# Patient Record
Sex: FEMALE | Race: WHITE | NOT HISPANIC OR LATINO | Employment: OTHER | URBAN - METROPOLITAN AREA
[De-identification: names, ages, dates, MRNs, and addresses within clinical notes are randomized per-mention and may not be internally consistent; named-entity substitution may affect disease eponyms.]

---

## 2013-06-10 LAB — CRC RECOMMENDATION EXT: NORMAL

## 2017-06-20 ENCOUNTER — HISTORICAL (OUTPATIENT)
Dept: RADIOLOGY | Facility: HOSPITAL | Age: 62
End: 2017-06-20

## 2017-06-20 LAB
ABS NEUT (OLG): 2.15 X10(3)/MCL (ref 2.1–9.2)
ALBUMIN SERPL-MCNC: 4.1 GM/DL (ref 3.4–5)
ALBUMIN/GLOB SERPL: 1 {RATIO}
ALP SERPL-CCNC: 61 UNIT/L (ref 20–120)
ALT SERPL-CCNC: 18 UNIT/L
AST SERPL-CCNC: 16 UNIT/L
BASOPHILS # BLD AUTO: 0.01 X10(3)/MCL
BASOPHILS NFR BLD AUTO: 0 % (ref 0–1)
BILIRUB SERPL-MCNC: 0.5 MG/DL
BILIRUBIN DIRECT+TOT PNL SERPL-MCNC: <0.1 MG/DL
BILIRUBIN DIRECT+TOT PNL SERPL-MCNC: >0.4 MG/DL
BUN SERPL-MCNC: 18 MG/DL (ref 7–25)
CALCIUM SERPL-MCNC: 9.1 MG/DL (ref 8.4–10.3)
CHLORIDE SERPL-SCNC: 105 MMOL/L (ref 96–110)
CHOLEST SERPL-MCNC: 211 MG/DL
CHOLEST/HDLC SERPL: 3.6 {RATIO} (ref 0–4.4)
CO2 SERPL-SCNC: 28 MMOL/L (ref 24–32)
CREAT SERPL-MCNC: 0.49 MG/DL (ref 0.7–1.1)
EOSINOPHIL # BLD AUTO: 0.21 X10(3)/MCL
EOSINOPHIL NFR BLD AUTO: 5 % (ref 0–5)
ERYTHROCYTE [DISTWIDTH] IN BLOOD BY AUTOMATED COUNT: 13.2 % (ref 11.5–14.5)
GLOBULIN SER-MCNC: 3.2 GM/ML (ref 2.3–3.5)
GLUCOSE SERPL-MCNC: 98 MG/DL (ref 65–99)
HCT VFR BLD AUTO: 42.4 % (ref 35–46)
HDLC SERPL-MCNC: 58 MG/DL
HGB BLD-MCNC: 14.2 GM/DL (ref 12–16)
LDLC SERPL CALC-MCNC: 144 MG/DL (ref 0–130)
LYMPHOCYTES # BLD AUTO: 1.38 X10(3)/MCL
LYMPHOCYTES NFR BLD AUTO: 32 % (ref 15–40)
MCH RBC QN AUTO: 29 PG (ref 26–34)
MCHC RBC AUTO-ENTMCNC: 33.5 GM/DL (ref 31–37)
MCV RBC AUTO: 86.7 FL (ref 80–100)
MONOCYTES # BLD AUTO: 0.57 X10(3)/MCL
MONOCYTES NFR BLD AUTO: 13 % (ref 4–12)
NEUTROPHILS # BLD AUTO: 2.15 X10(3)/MCL
NEUTROPHILS NFR BLD AUTO: 50 X10(3)/MCL
PLATELET # BLD AUTO: 294 X10(3)/MCL (ref 130–400)
PMV BLD AUTO: 9.2 FL (ref 7.4–10.4)
POTASSIUM SERPL-SCNC: 4.3 MMOL/L (ref 3.6–5.2)
PROT SERPL-MCNC: 7.3 GM/DL (ref 6–8)
RBC # BLD AUTO: 4.89 X10(6)/MCL (ref 4–5.2)
SODIUM SERPL-SCNC: 139 MMOL/L (ref 135–146)
T4 FREE SERPL-MCNC: 0.64 NG/DL (ref 0.6–1.15)
TRIGL SERPL-MCNC: 47 MG/DL
TSH SERPL-ACNC: 2.01 MIU/L (ref 0.5–5)
VLDLC SERPL CALC-MCNC: 9 MG/DL
WBC # SPEC AUTO: 4.3 X10(3)/MCL (ref 4.5–11)

## 2017-06-28 ENCOUNTER — HISTORICAL (OUTPATIENT)
Dept: CARDIOLOGY | Facility: CLINIC | Age: 62
End: 2017-06-28

## 2018-03-15 ENCOUNTER — HISTORICAL (OUTPATIENT)
Dept: RADIOLOGY | Facility: HOSPITAL | Age: 63
End: 2018-03-15

## 2018-03-16 ENCOUNTER — HISTORICAL (OUTPATIENT)
Dept: RADIOLOGY | Facility: HOSPITAL | Age: 63
End: 2018-03-16

## 2018-06-12 ENCOUNTER — HISTORICAL (OUTPATIENT)
Dept: RADIOLOGY | Facility: HOSPITAL | Age: 63
End: 2018-06-12

## 2019-05-15 LAB — CRC RECOMMENDATION EXT: NORMAL

## 2021-05-12 ENCOUNTER — HISTORICAL (OUTPATIENT)
Dept: ADMINISTRATIVE | Facility: HOSPITAL | Age: 66
End: 2021-05-12

## 2021-05-12 LAB
ABS NEUT (OLG): 1.81 X10(3)/MCL (ref 2.1–9.2)
ALBUMIN SERPL-MCNC: 4.3 GM/DL (ref 3.4–4.8)
ALBUMIN/GLOB SERPL: 1.1 RATIO (ref 1.1–2)
ALP SERPL-CCNC: 96 UNIT/L (ref 40–150)
ALT SERPL-CCNC: 23 UNIT/L (ref 0–55)
APPEARANCE, UA: CLEAR
AST SERPL-CCNC: 26 UNIT/L (ref 5–34)
BACTERIA #/AREA URNS AUTO: ABNORMAL /HPF
BASOPHILS # BLD AUTO: 0 X10(3)/MCL (ref 0–0.2)
BASOPHILS NFR BLD AUTO: 1 %
BILIRUB SERPL-MCNC: 0.7 MG/DL
BILIRUB UR QL STRIP: NEGATIVE
BILIRUBIN DIRECT+TOT PNL SERPL-MCNC: 0.3 MG/DL (ref 0–0.5)
BILIRUBIN DIRECT+TOT PNL SERPL-MCNC: 0.4 MG/DL (ref 0–0.8)
BUN SERPL-MCNC: 9.8 MG/DL (ref 9.8–20.1)
CALCIUM SERPL-MCNC: 10.3 MG/DL (ref 8.4–10.2)
CHLORIDE SERPL-SCNC: 102 MMOL/L (ref 98–107)
CHOLEST SERPL-MCNC: 185 MG/DL
CHOLEST/HDLC SERPL: 3 {RATIO} (ref 0–5)
CO2 SERPL-SCNC: 29 MMOL/L (ref 23–31)
COLOR UR: ABNORMAL
CREAT SERPL-MCNC: 0.66 MG/DL (ref 0.55–1.02)
DEPRECATED CALCIDIOL+CALCIFEROL SERPL-MC: 42 NG/ML (ref 30–80)
EOSINOPHIL # BLD AUTO: 0.2 X10(3)/MCL (ref 0–0.9)
EOSINOPHIL NFR BLD AUTO: 4 %
ERYTHROCYTE [DISTWIDTH] IN BLOOD BY AUTOMATED COUNT: 13.7 % (ref 11.5–14.5)
EST CREAT CLEARANCE SER (OHS): 78.38 ML/MIN
GLOBULIN SER-MCNC: 3.9 GM/DL (ref 2.4–3.5)
GLUCOSE (UA): NEGATIVE
GLUCOSE SERPL-MCNC: 81 MG/DL (ref 82–115)
HCT VFR BLD AUTO: 45.6 % (ref 35–46)
HDLC SERPL-MCNC: 61 MG/DL (ref 35–60)
HGB BLD-MCNC: 14.7 GM/DL (ref 12–16)
HGB UR QL STRIP: NEGATIVE
HYALINE CASTS #/AREA URNS LPF: ABNORMAL /LPF
IMM GRANULOCYTES # BLD AUTO: 0.02 10*3/UL
IMM GRANULOCYTES NFR BLD AUTO: 0 %
KETONES UR QL STRIP: NEGATIVE
LDLC SERPL CALC-MCNC: 106 MG/DL (ref 50–140)
LEUKOCYTE ESTERASE UR QL STRIP: 250 LEU/UL
LYMPHOCYTES # BLD AUTO: 1.8 X10(3)/MCL (ref 0.6–4.6)
LYMPHOCYTES NFR BLD AUTO: 42 %
MCH RBC QN AUTO: 29.3 PG (ref 26–34)
MCHC RBC AUTO-ENTMCNC: 32.2 GM/DL (ref 31–37)
MCV RBC AUTO: 90.8 FL (ref 80–100)
MONOCYTES # BLD AUTO: 0.5 X10(3)/MCL (ref 0.1–1.3)
MONOCYTES NFR BLD AUTO: 12 %
NEUTROPHILS # BLD AUTO: 1.81 X10(3)/MCL (ref 2.1–9.2)
NEUTROPHILS NFR BLD AUTO: 41 %
NITRITE UR QL STRIP: NEGATIVE
PH UR STRIP: 6.5 [PH] (ref 4.5–8)
PLATELET # BLD AUTO: 335 X10(3)/MCL (ref 130–400)
PMV BLD AUTO: 9.7 FL (ref 7.4–10.4)
POTASSIUM SERPL-SCNC: 3.9 MMOL/L (ref 3.5–5.1)
PROT SERPL-MCNC: 8.2 GM/DL (ref 5.8–7.6)
PROT UR QL STRIP: NEGATIVE
RBC # BLD AUTO: 5.02 X10(6)/MCL (ref 4–5.2)
RBC #/AREA URNS AUTO: ABNORMAL /HPF
SODIUM SERPL-SCNC: 141 MMOL/L (ref 136–145)
SP GR UR STRIP: 1 (ref 1–1.03)
SQUAMOUS #/AREA URNS LPF: ABNORMAL /LPF
TRIGL SERPL-MCNC: 92 MG/DL (ref 37–140)
TSH SERPL-ACNC: 1.83 UIU/ML (ref 0.35–4.94)
UROBILINOGEN UR STRIP-ACNC: NORMAL
VIT B12 SERPL-MCNC: 688 PG/ML (ref 213–816)
VLDLC SERPL CALC-MCNC: 18 MG/DL
WBC # SPEC AUTO: 4.4 X10(3)/MCL (ref 4.5–11)
WBC #/AREA URNS AUTO: ABNORMAL /HPF

## 2021-05-14 LAB — FINAL CULTURE: NORMAL

## 2021-06-07 ENCOUNTER — HISTORICAL (OUTPATIENT)
Dept: ADMINISTRATIVE | Facility: HOSPITAL | Age: 66
End: 2021-06-07

## 2021-06-09 ENCOUNTER — HISTORICAL (OUTPATIENT)
Dept: ADMINISTRATIVE | Facility: HOSPITAL | Age: 66
End: 2021-06-09

## 2021-08-25 ENCOUNTER — HISTORICAL (OUTPATIENT)
Dept: RADIOLOGY | Facility: HOSPITAL | Age: 66
End: 2021-08-25

## 2021-09-28 LAB
HUMAN PAPILLOMAVIRUS (HPV): NORMAL
PAP RECOMMENDATION EXT: NORMAL
PAP SMEAR: NORMAL

## 2021-11-17 ENCOUNTER — HISTORICAL (OUTPATIENT)
Dept: RADIOLOGY | Facility: HOSPITAL | Age: 66
End: 2021-11-17

## 2021-11-17 LAB — BCS RECOMMENDATION EXT: NORMAL

## 2022-02-18 ENCOUNTER — HISTORICAL (OUTPATIENT)
Dept: FAMILY MEDICINE | Facility: CLINIC | Age: 67
End: 2022-02-18

## 2022-02-18 LAB
ABS NEUT (OLG): 1.69 (ref 2.1–9.2)
ALBUMIN SERPL-MCNC: 3.7 G/DL (ref 3.4–4.8)
ALBUMIN/GLOB SERPL: 1.1 {RATIO} (ref 1.1–2)
ALP SERPL-CCNC: 73 U/L (ref 40–150)
ALT SERPL-CCNC: 15 U/L (ref 0–55)
AST SERPL-CCNC: 19 U/L (ref 5–34)
BASOPHILS # BLD AUTO: 0 10*3/UL (ref 0–0.2)
BASOPHILS NFR BLD AUTO: 0 %
BILIRUB SERPL-MCNC: 0.6 MG/DL
BILIRUBIN DIRECT+TOT PNL SERPL-MCNC: 0.3 (ref 0–0.5)
BILIRUBIN DIRECT+TOT PNL SERPL-MCNC: 0.3 (ref 0–0.8)
BUN SERPL-MCNC: 12 MG/DL (ref 9.8–20.1)
CALCIUM SERPL-MCNC: 9 MG/DL (ref 8.7–10.5)
CHLORIDE SERPL-SCNC: 107 MMOL/L (ref 98–107)
CHOLEST SERPL-MCNC: 149 MG/DL
CHOLEST/HDLC SERPL: 3 {RATIO} (ref 0–5)
CO2 SERPL-SCNC: 26 MMOL/L (ref 23–31)
CREAT SERPL-MCNC: 0.66 MG/DL (ref 0.55–1.02)
CREAT UR-MCNC: 50.9 MG/DL (ref 45–106)
EOSINOPHIL # BLD AUTO: 0.2 10*3/UL (ref 0–0.9)
EOSINOPHIL NFR BLD AUTO: 5 %
ERYTHROCYTE [DISTWIDTH] IN BLOOD BY AUTOMATED COUNT: 13.6 % (ref 11.5–14.5)
EST. AVERAGE GLUCOSE BLD GHB EST-MCNC: 105.4 MG/DL
FLAG2 (OHS): 70
FLAG3 (OHS): 80
FLAGS (OHS): 80
GLOBULIN SER-MCNC: 3.3 G/DL (ref 2.4–3.5)
GLUCOSE SERPL-MCNC: 105 MG/DL (ref 82–115)
HBA1C MFR BLD: 5.3 %
HCT VFR BLD AUTO: 42 % (ref 35–46)
HDLC SERPL-MCNC: 58 MG/DL (ref 35–60)
HEMOLYSIS INTERF INDEX SERPL-ACNC: 10
HGB BLD-MCNC: 13.8 G/DL (ref 12–16)
ICTERIC INTERF INDEX SERPL-ACNC: 1
IMM GRANULOCYTES # BLD AUTO: 0.01 10*3/UL
IMM GRANULOCYTES NFR BLD AUTO: 0 %
LDLC SERPL CALC-MCNC: 80 MG/DL (ref 50–140)
LIPEMIC INTERF INDEX SERPL-ACNC: 2
LOW EVENT # SUSPECT FLAG (OHS): 80
LYMPHOCYTES # BLD AUTO: 1.3 10*3/UL (ref 0.6–4.6)
LYMPHOCYTES NFR BLD AUTO: 34 %
MANUAL DIFF? (OHS): NO
MCH RBC QN AUTO: 29.6 PG (ref 26–34)
MCHC RBC AUTO-ENTMCNC: 32.9 G/DL (ref 31–37)
MCV RBC AUTO: 89.9 FL (ref 80–100)
MICROALBUMIN UR-MCNC: 7.6
MICROALBUMIN/CREAT RATIO PNL UR: 14.9 (ref 0–30)
MO BLASTS SUSPECT FLAG (OHS): 40
MONOCYTES # BLD AUTO: 0.5 10*3/UL (ref 0.1–1.3)
MONOCYTES NFR BLD AUTO: 14 %
NEUTROPHILS # BLD AUTO: 1.69 10*3/UL (ref 2.1–9.2)
NEUTROPHILS NFR BLD AUTO: 46 %
NRBC BLD AUTO-RTO: 0 % (ref 0–0.2)
PLATELET # BLD AUTO: 298 10*3/UL (ref 130–400)
PMV BLD AUTO: 9 FL (ref 7.4–10.4)
POTASSIUM SERPL-SCNC: 4.4 MMOL/L (ref 3.5–5.1)
PROT SERPL-MCNC: 7 G/DL (ref 5.8–7.6)
RBC # BLD AUTO: 4.67 10*6/UL (ref 4–5.2)
SODIUM SERPL-SCNC: 139 MMOL/L (ref 136–145)
TRIGL SERPL-MCNC: 56 MG/DL (ref 37–140)
TSH SERPL-ACNC: 1.75 M[IU]/L (ref 0.35–4.94)
VLDLC SERPL CALC-MCNC: 11 MG/DL
WBC # SPEC AUTO: 3.7 10*3/UL (ref 4.5–11)

## 2022-04-10 ENCOUNTER — HISTORICAL (OUTPATIENT)
Dept: ADMINISTRATIVE | Facility: HOSPITAL | Age: 67
End: 2022-04-10
Payer: MEDICAID

## 2022-04-26 VITALS
HEIGHT: 65 IN | WEIGHT: 170.19 LBS | SYSTOLIC BLOOD PRESSURE: 115 MMHG | OXYGEN SATURATION: 99 % | DIASTOLIC BLOOD PRESSURE: 79 MMHG | BODY MASS INDEX: 28.36 KG/M2

## 2022-04-30 NOTE — PROGRESS NOTES
Patient:   Jennie Julio             MRN: [id number]            FIN: [id number]               Age:   65 years     Sex:  Female     :  1955   Associated Diagnoses:   None   Author:   Charmaine WEEKS, Soni VICENTE      Patient given results of imaging and labs in detail. OA of right ankle , hip and knee noted on imaging. Patient r

## 2022-05-03 NOTE — HISTORICAL [facility] CERNER
This is a historical note converted from Gregory. Formatting and pictures may have been removed.  Please reference Gregory for original formatting and attached multimedia. Chief Complaint  F/U left leg cellullitis. Pt concerned with left knee enlarged vein. Right pain from ankle to hip  History of Present Illness  And is a recently established patient with a history of hypertension, hyperlipidemia,?mitral valve prolapse,?varicose veins?of bilateral lower extremities?and anxiety who presents?for follow-up.? She was recently seen?in?an outside urgent?care facility and diagnosed with?sciatica?in the recent past.? She has been taking ibuprofen,?gabapentin?and Flexeril without relief.? Symptoms began?approximately the time she slipped and fell on black ice in February.? She persistent pain?right lower back and right lower extremity?since that fall.? X-rays?of her lumbar spine showed some?moderate degenerative change at the L3-4?level.? She complains of pain radiating down to the right lateral ankle?the pain is worse with movement.? She is waiting for approval of the physical therapy we ordered from?her insurance company.? Since our last visit, she was also seen in the urgent care?5/19/2021 and diagnosed with a insect bite?by?the urgent care doctor.? He treated her with topical hydrocortisone?which did not resolve her symptoms.? She was then seen in our walk-in clinic?5/24/2021?and diagnosed with a cellulitis/phlebitis?of her left thigh?and treated with?oral?doxycycline which she has since completed.  ?  Interval history:  ?  Chronic anxiety:?Marked improvement in symptoms per patient with increase to Effexor XR 75. ?No SI/HI  Osteoarthritis?of the right ankle: She has a history?of?a right?ankle fracture?from her days playing basketball.? She has had osteoarthritis of this right ankle documented in the past?but her pain today feels different?as it appears to radiate from?her low back.  Varicose veins bilateral lower  extremity:?We discussed?using mild to moderate?compression stockings while awaiting an appointment with Dr. Combs which she will?occur on June 28 at 9 AM.? She will see the nurse practitioner on that date.  Review of Systems  See HPI  Physical Exam  Vitals & Measurements  T:?37.0? ?C (Oral)? HR:?88(Peripheral)? RR:?18? BP:?112/76? SpO2:?98%?  HT:?165.00?cm? WT:?74.000?kg? BMI:?27.18?  Alert oriented x4 no acute distress  Psych: Calm, cooperative; no HI/SI  Palpable?bony prominence right lateral knee-patient unaware how long this has been present  Tenderness right lateral ankle  Positive straight leg raise; pain over paraspinal muscles and right?piriformis  Assessment/Plan  1.?Right-sided low back pain with right-sided sciatica?M54.41  ?Differential includes?piriformis syndrome/sciatica/degenerative disc disease. ?Patient has not responded to conservative management with muscle relaxers, NSAIDs?and gabapentin.??Symptoms have been present for 4 months.??She has failed home?exercise program. ?Trial of for physical therapy pending?approval by?[place] [facility].? Add tramadol?50 mg 1 p.o. 3 times daily as needed.?  accessed?no prior prescriptions?except gabapentin?noted.  Ordered:  MRI Lumbar Spine W/O Contrast, Routine, *Est. 06/07/21 3:00:00 CDT, Other (please specify), Lumbar radiculopathy, symptoms persist with conservative treatment, None, Ambulatory, Rad Type, Order for future visit, Radiculopathy, lumbosacral region  Right-sided low back pain with rig...  ?  2.?Radiculopathy, lumbosacral region?M54.17  ?MRI of the lumbar spine ordered?without contrast to rule out?degenerative disc disease  Ordered:  MRI Lumbar Spine W/O Contrast, Routine, *Est. 06/07/21 3:00:00 CDT, Other (please specify), Lumbar radiculopathy, symptoms persist with conservative treatment, None, Ambulatory, Rad Type, Order for future visit, Radiculopathy, lumbosacral region  Right-sided low back pain with rig...  ?  3.?Varicose veins of  bilateral lower extremities with pain?I83.813  ?Has appointment with Dr. Combs for June 28?9 AM. ?Cellulitis resolved  ?  4.?Pain and swelling of right knee?M25.561  ?Osteoarthritis of the right knee suspected?from prior injury.? Will?obtain x-ray?to further elucidate the bony prominence present on the right lateral?patella  Ordered:  XR Knee Right 3 Views, Routine, 06/07/21 15:40:00 CDT, None, Ambulatory, Rad Type, Pain and swelling of right knee, Not Scheduled, 06/07/21 15:40:00 CDT  ?  5.?Pain in lateral portion of right ankle?M25.571  ?History of osteoarthritis of the right ankle?and history?prior?remote?fracture?with?ankle pain.? Will image to rule out other pathology  Ordered:  XR Ankle Right Minimum 3 Views, Routine, 06/07/21 15:40:00 CDT, None, Ambulatory, Rad Type, Pain in lateral portion of right ankle, Not Scheduled, 06/07/21 15:40:00 CDT  ?  6.?Right hip pain?M25.551  Suspect pain is radiating?from lumbar?area.? Will image right hip to ensure no evidence of osteoarthritis present.  Ordered:  XR Hip Right 2 Views, Routine, 06/07/21 15:40:00 CDT, None, Ambulatory, Rad Type, Right hip pain, Not Scheduled, 06/07/21 15:40:00 CDT  ?  7.?Chronic anxiety?F41.9  ?Continue Effexor?XR?75?mg daily  ?  8.?OA - Osteoarthritis of ankle?M19.079  ?See #5  ?  Orders:  traMADol, 50 mg = 1 tab(s), Oral, TID, PRN PRN pain, X 10 day(s), # 30 tab(s), 0 Refill(s), Pharmacy: [id number], 165, cm, Height/Length Dosing, 06/07/21 14:26:00 CDT, 74, kg, Weight Dosing, 06/07/21 14:26:00 CDT  Referrals  Clinic Follow up, *Est. 06/21/21 3:00:00 CDT, Order for future visit, Right-sided low back pain with right-sided sciatica  Radiculopathy, lumbosacral region, [facility]   Problem List/Past Medical History  Ongoing  Anxiety  Chronic anxiety  Essential hypertension  HLD (hyperlipidemia)  HTN (hypertension)  Mitral valve prolapse  Mixed hyperlipidemia  MVP (mitral valve prolapse)  OA - Osteoarthritis of  ankle  Radiculopathy, lumbosacral region  Right-sided low back pain with right-sided sciatica  Varicose veins of bilateral lower extremities with pain  Varicose veins of legs  Historical  No qualifying data  Procedure/Surgical History  Colonoscopy (2017)  Tonsillectomy   Medications  aspirin 81 mg oral Delayed Release (EC) tablet, 81 mg= 1 tab(s), Oral, Daily, 2 refills  atorvastatin 40 mg oral tablet, 40 mg= 1 tab(s), Oral, Daily, 2 refills  benazepril 20 mg oral tablet, 20 mg= 1 tab(s), Oral, Daily, 2 refills  cyclobenzaprine 10 mg oral tablet, 10 mg= 1 tab(s), Oral, TID  Effexor XR 75 mg oral capsule, extended release, 75 mg= 1 cap(s), Oral, Daily, 2 refills  gabapentin 100 mg oral capsule, 100 mg= 1 cap(s), Oral, TID, 2 refills  traMADol 50 mg oral tablet, 50 mg= 1 tab(s), Oral, TID, PRN  Allergies  Sulfur?(diarrhea)  sulfa drugs?(Nausea, vomiting and diarrhea)  Social History  Abuse/Neglect  No, No, Yes, 06/07/2021  Alcohol  Past, Alcohol use interferes with work or home: No. Others hurt by drinking: No. Household alcohol concerns: No., 06/07/2021  Employment/School  Retired, 05/12/2021  Exercise  Exercise duration: 0., 06/07/2021  Home/Environment  Lives with Siblings. Living situation: Home/Independent., 05/12/2021    Never in , 05/12/2021  Nutrition/Health  Low fat, Low sodium, Good, 06/07/2021  Sexual  Gender Identity Identifies as female., 05/12/2021  Spiritual/Cultural  Judaism, No, 06/07/2021  Substance Use  Never, 06/07/2021  Tobacco  Never (less than 100 in lifetime), N/A, 06/07/2021  Family History  Alcoholism.: Father and Paternal Grandfather.  Diabetes mellitus type 2: Sister and Maternal Grandmother.  Metastatic cancer: Mother.  Primary malignant neoplasm of colon: Mother.  Stroke: Father.  Immunizations  Vaccine Date Status   COVID-19 mRNA, LNP-S, PF - Moderna 04/16/2021 Recorded   COVID-19 mRNA, LNP-S, PF - Moderna 03/20/2021 Recorded   influenza virus vaccine, inactivated  10/05/2020 Recorded   influenza virus vaccine, inactivated 11/23/2019 Recorded   influenza virus vaccine, inactivated 11/18/2013 Recorded   (06/07/2021 15:51 CDT XR Ankle Right Minimum 3 Views)  * Final Report *  ?  Reason For Exam  Pain  ?  Radiology Report  XR Ankle Right Minimum 3 Views  ?  HISTORY: Pain  ?  COMPARISON: None available  ?  FINDINGS:  No acute lucent fracture or dislocation. Regional arthritic change is  mild. Achilles enthesophyte noted. Soft tissue ossification noted  dorsal to the ankle joint.  ?  Signature Line  Electronically Signed By: America Ellis MD  Date/Time Signed: 06/07/2021 16:31 [1] (06/07/2021 15:51 CDT XR Hip Right 2 Views)  ?  Reason For Exam  Pain  ?  Radiology Report  ?  Clinical: Pain.  ?  FINDINGS: There is mild degenerative narrowing of the right hip joint  with mild subchondral sclerosis. No lytic or destructive change.  Articular surfaces alignment is maintained. No acute fractures.  ?  IMPRESSION:?  ?  Right hip mild degenerative changes.  ?  Signature Line  Electronically Signed By: Santo Peguero MD  Date/Time Signed: 06/07/2021 16:37  ? [2]  Diagnostic Results  (06/07/2021 15:51 CDT XR Knee Right 3 Views)  * Final Report *  ?  Reason For Exam  Pain  ?  Radiology Report  ?  Clinical: Pain.  ?  FINDINGS: There are right knee tricompartment mild degenerative  osteoarthritic changes. Right knee medial compartment degenerative  process is relatively more advanced with subchondral sclerosis and  marginal osteophytes. Articular surfaces alignment is maintained. No  acute fracture or subluxations. There is no joint effusion.?  ?  IMPRESSION:?  ?  Right knee tricompartment mild degenerative osteoarthritic changes.  ?  Signature Line  Electronically Signed By: Santo Peguero MD  Date/Time Signed: 06/07/2021 16:39 [3]     [1]?XR Ankle Right Minimum 3 Views; America Ellis MD 06/07/2021 15:51 CDT  [2]?XR Hip Right 2 Views; Santo Peguero MD 06/07/2021 15:51  CDT  [3]?XR Knee Right 3 Views; Sudhir WEEKS, Santo ANTONY 06/07/2021 15:51 CDT   JAME not And. She was seen at [facility] May 19th and diagnosed with a pinched nerve and was given hydrocortisone cream for presumed insect bite.
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
## 2022-05-16 PROBLEM — I10 PRIMARY HYPERTENSION: Status: ACTIVE | Noted: 2022-05-16

## 2022-05-16 PROBLEM — I34.1 MITRAL VALVE PROLAPSE: Status: ACTIVE | Noted: 2022-05-16

## 2022-05-16 PROBLEM — M19.079 OSTEOARTHRITIS OF ANKLE: Status: ACTIVE | Noted: 2022-05-16

## 2022-05-16 PROBLEM — F32.A MILD DEPRESSION: Status: ACTIVE | Noted: 2022-05-16

## 2022-05-16 PROBLEM — F41.9 CHRONIC ANXIETY: Status: ACTIVE | Noted: 2022-05-16

## 2022-05-16 PROBLEM — E78.2 MIXED HYPERLIPIDEMIA: Status: ACTIVE | Noted: 2022-05-16

## 2022-05-16 PROBLEM — M54.40 LOW BACK PAIN WITH SCIATICA: Status: ACTIVE | Noted: 2022-05-16

## 2022-05-16 PROBLEM — I83.90 VARICOSE VEINS OF LOWER EXTREMITY: Status: ACTIVE | Noted: 2022-05-16

## 2022-05-16 RX ORDER — BENAZEPRIL HYDROCHLORIDE 20 MG/1
TABLET ORAL
Qty: 30 TABLET | Refills: 5 | Status: SHIPPED | OUTPATIENT
Start: 2022-05-16 | End: 2022-11-15

## 2022-05-16 RX ORDER — GABAPENTIN 100 MG/1
CAPSULE ORAL
COMMUNITY
Start: 2022-04-14 | End: 2022-05-16 | Stop reason: SDUPTHER

## 2022-05-16 RX ORDER — ATORVASTATIN CALCIUM 40 MG/1
TABLET, FILM COATED ORAL
Qty: 30 TABLET | Refills: 5 | Status: SHIPPED | OUTPATIENT
Start: 2022-05-16 | End: 2022-11-15

## 2022-05-16 RX ORDER — ASPIRIN 81 MG/1
1 TABLET ORAL DAILY
COMMUNITY
Start: 2021-09-22 | End: 2024-02-05 | Stop reason: SDUPTHER

## 2022-05-16 RX ORDER — GABAPENTIN 100 MG/1
CAPSULE ORAL
Qty: 150 CAPSULE | Refills: 3 | Status: SHIPPED | OUTPATIENT
Start: 2022-05-16 | End: 2022-09-14

## 2022-05-16 RX ORDER — VENLAFAXINE HYDROCHLORIDE 75 MG/1
1 CAPSULE, EXTENDED RELEASE ORAL DAILY
COMMUNITY
Start: 2022-04-14 | End: 2022-05-16 | Stop reason: SDUPTHER

## 2022-05-16 RX ORDER — TRIAMCINOLONE ACETONIDE 1 MG/G
CREAM TOPICAL
COMMUNITY
Start: 2022-01-10 | End: 2022-06-02

## 2022-05-16 RX ORDER — FLUTICASONE PROPIONATE 50 MCG
2 SPRAY, SUSPENSION (ML) NASAL DAILY
COMMUNITY
Start: 2022-01-04 | End: 2023-01-18 | Stop reason: SDUPTHER

## 2022-05-16 RX ORDER — BENAZEPRIL HYDROCHLORIDE 20 MG/1
1 TABLET ORAL DAILY
COMMUNITY
Start: 2022-04-14 | End: 2022-05-16 | Stop reason: SDUPTHER

## 2022-05-16 RX ORDER — ATORVASTATIN CALCIUM 40 MG/1
1 TABLET, FILM COATED ORAL DAILY
COMMUNITY
Start: 2022-04-14 | End: 2022-05-16 | Stop reason: SDUPTHER

## 2022-05-16 RX ORDER — VENLAFAXINE HYDROCHLORIDE 75 MG/1
CAPSULE, EXTENDED RELEASE ORAL
Qty: 30 CAPSULE | Refills: 5 | Status: SHIPPED | OUTPATIENT
Start: 2022-05-16 | End: 2022-11-15

## 2022-05-16 RX ORDER — CYCLOBENZAPRINE HCL 10 MG
1 TABLET ORAL 3 TIMES DAILY PRN
COMMUNITY
Start: 2022-04-20 | End: 2024-02-05

## 2022-05-25 DIAGNOSIS — R30.0 DYSURIA: ICD-10-CM

## 2022-05-25 DIAGNOSIS — R35.0 INCREASED URINARY FREQUENCY: Primary | ICD-10-CM

## 2022-05-25 RX ORDER — NITROFURANTOIN 25; 75 MG/1; MG/1
100 CAPSULE ORAL 2 TIMES DAILY
Qty: 14 CAPSULE | Refills: 0 | Status: SHIPPED | OUTPATIENT
Start: 2022-05-25 | End: 2022-06-02

## 2022-05-26 ENCOUNTER — CLINICAL SUPPORT (OUTPATIENT)
Dept: FAMILY MEDICINE | Facility: CLINIC | Age: 67
End: 2022-05-26
Payer: MEDICAID

## 2022-05-26 DIAGNOSIS — R35.0 URINARY FREQUENCY: ICD-10-CM

## 2022-05-26 DIAGNOSIS — R30.0 DYSURIA: Primary | ICD-10-CM

## 2022-05-26 DIAGNOSIS — R35.0 INCREASED URINARY FREQUENCY: ICD-10-CM

## 2022-05-26 DIAGNOSIS — Z11.3 ROUTINE SCREENING FOR STI (SEXUALLY TRANSMITTED INFECTION): ICD-10-CM

## 2022-05-26 LAB
APPEARANCE UR: CLEAR
BACTERIA #/AREA URNS AUTO: ABNORMAL /HPF
BILIRUB UR QL STRIP.AUTO: NEGATIVE MG/DL
C TRACH DNA SPEC QL NAA+PROBE: NOT DETECTED
COLOR UR AUTO: YELLOW
GLUCOSE UR QL STRIP.AUTO: NORMAL MG/DL
HBV SURFACE AG SERPL QL IA: NONREACTIVE
HCV AB SERPL QL IA: NONREACTIVE
HIV 1+2 AB+HIV1 P24 AG SERPL QL IA: NONREACTIVE
HYALINE CASTS #/AREA URNS LPF: ABNORMAL /LPF
KETONES UR QL STRIP.AUTO: NEGATIVE MG/DL
LEUKOCYTE ESTERASE UR QL STRIP.AUTO: 500 UNIT/L
N GONORRHOEA DNA SPEC QL NAA+PROBE: NOT DETECTED
NITRITE UR QL STRIP.AUTO: ABNORMAL
PH UR STRIP.AUTO: 7 [PH]
PROT UR QL STRIP.AUTO: ABNORMAL MG/DL
RBC #/AREA URNS AUTO: ABNORMAL /HPF
RBC UR QL AUTO: ABNORMAL UNIT/L
SP GR UR STRIP.AUTO: 1.01
SQUAMOUS #/AREA URNS LPF: ABNORMAL /HPF
T PALLIDUM AB SER QL: NONREACTIVE
T PALLIDUM AB SER QL: NORMAL
UNIDENT CRYS #/AREA URNS HPF: ABNORMAL /HPF
UROBILINOGEN UR STRIP-ACNC: NORMAL MG/DL
WBC #/AREA URNS AUTO: ABNORMAL /HPF

## 2022-05-26 PROCEDURE — 87340 HEPATITIS B SURFACE AG IA: CPT

## 2022-05-26 PROCEDURE — 87389 HIV-1 AG W/HIV-1&-2 AB AG IA: CPT

## 2022-05-26 PROCEDURE — 87491 CHLMYD TRACH DNA AMP PROBE: CPT

## 2022-05-26 PROCEDURE — 86803 HEPATITIS C AB TEST: CPT

## 2022-05-26 PROCEDURE — 36415 COLL VENOUS BLD VENIPUNCTURE: CPT

## 2022-05-26 PROCEDURE — 87591 N.GONORRHOEAE DNA AMP PROB: CPT

## 2022-05-26 PROCEDURE — 81001 URINALYSIS AUTO W/SCOPE: CPT

## 2022-05-26 PROCEDURE — 86780 TREPONEMA PALLIDUM: CPT

## 2022-05-26 NOTE — TELEPHONE ENCOUNTER
Patient recently became sexually active with a new partner. She has symptoms of dysuria/urinary frequency. She denies hematuria, flank pain, fever, chills N/V. History of sulfa allergy but tolerated macrodantin in the past. She will collect a urine specimen in am   Macrobid 100 mg BID x 7 days   Patient to call to  culture result  Consider testing for GC/Chlamydia

## 2022-05-26 NOTE — PROGRESS NOTES
Patient recently became sexually active with new partner. Reports dysuria and frequency x 3 days

## 2022-06-02 ENCOUNTER — OFFICE VISIT (OUTPATIENT)
Dept: FAMILY MEDICINE | Facility: CLINIC | Age: 67
End: 2022-06-02
Payer: MEDICAID

## 2022-06-02 VITALS
WEIGHT: 170.63 LBS | HEIGHT: 64 IN | DIASTOLIC BLOOD PRESSURE: 77 MMHG | OXYGEN SATURATION: 99 % | SYSTOLIC BLOOD PRESSURE: 125 MMHG | TEMPERATURE: 98 F | HEART RATE: 83 BPM | BODY MASS INDEX: 29.13 KG/M2

## 2022-06-02 DIAGNOSIS — T50.Z95A ARTHRALGIA AFTER VACCINATION: Primary | ICD-10-CM

## 2022-06-02 DIAGNOSIS — M25.50 ARTHRALGIA AFTER VACCINATION: Primary | ICD-10-CM

## 2022-06-02 PROCEDURE — 99213 OFFICE O/P EST LOW 20 MIN: CPT | Mod: PBBFAC

## 2022-06-02 NOTE — PROGRESS NOTES
"[facility] Office Visit Note    CC:   Reaction to Vaccine ( Covid booster 2 days ago Pt. States , " Arm(R)she received vaccine sore and hard to left.")     HPI:  Jennie Julio is a 66 y.o. female who presents for pain in shoulder after getting immunization.     Received 2nd Moderna booster 5/31/2022  Since immunization, has localized, exquisitely tender area at injection site. Pain limiting shoulder ROM. Unable to abduct affected arm >90 degrees. No rash or overlying erythema. Palpable, tender nodule at injection site. No issues with prior immunizations.    Review of Systems:   Review of Systems   Constitutional: Negative for chills and fever.   Respiratory: Negative for cough and shortness of breath.    Cardiovascular: Negative for chest pain and palpitations.   Gastrointestinal: Negative for abdominal pain, diarrhea, nausea and vomiting.   Genitourinary: Negative for dysuria.   Skin: Negative for itching and rash.   Neurological: Negative for dizziness, weakness and headaches.        Current Outpatient Medications   Medication Instructions    aspirin (ECOTRIN) 81 MG EC tablet 1 tablet, Oral, Daily    atorvastatin (LIPITOR) 40 MG tablet TAKE ONE TABLET BY MOUTH EVERY DAY    benazepriL (LOTENSIN) 20 MG tablet TAKE ONE TABLET BY MOUTH EVERY DAY    cyclobenzaprine (FLEXERIL) 10 MG tablet 1 tablet, Oral, 3 times daily PRN    fluticasone propionate (FLONASE) 50 mcg/actuation nasal spray 2 sprays, Each Nostril, Daily    gabapentin (NEURONTIN) 100 MG capsule TAKE ONE TABLET BY MOUTH IN THE MORNING, THEN TAKE ONE CAPSULE AT NOON, THEN TAKE THREE CAPSULES AT NIGHT    nitrofurantoin, macrocrystal-monohydrate, (MACROBID) 100 MG capsule 100 mg, Oral, 2 times daily    triamcinolone acetonide 0.1% (KENALOG) 0.1 % cream   See Instructions, APPLY TO AFFECTED AREA(S) TOPICALLY TWO TIMES A DAY FOR 14 DAYS, # 60 gm, 2 Refill(s), Pharmacy: [facility] [id number] [id number], cm, Height/Length Dosing, 12/08/21 9:56:00 CST, 77.2, kg, " Weight Dosing, 12/08/21 9:56:00 CST    venlafaxine (EFFEXOR-XR) 75 MG 24 hr capsule TAKE ONE CAPSULE BY MOUTH EVERY DAY        Physical Exam:   Vitals:    06/02/22 1327   BP: 125/77   Pulse: 83   Temp: 98.1 °F (36.7 °C)      Physical Exam   Constitutional: She is oriented to person, place, and time. No distress.   Cardiovascular: Normal rate, regular rhythm, normal heart sounds and normal pulses.   Pulmonary/Chest: Effort normal and breath sounds normal.   Abdominal: Soft. Bowel sounds are normal. There is no abdominal tenderness.   Musculoskeletal:      Comments: Decreased R shoulder abduction, painful arc at 90 degrees on R. Palpable, tender nodule in R deltoid. No overlying ecchymosis, erythema, or swelling.    Neurological: She is alert and oriented to person, place, and time. She displays no weakness. No sensory deficit.   Skin: Skin is warm and dry. No bruising and no rash noted. No erythema.        Assessment/Plan:  1. Arthralgia after vaccination        -continue PRN Tylenol, ibuprofen, and ice therapy for pain. Encouraged to avoid limiting shoulder use. Return precautions given.    Keep scheduled appt with PCP next month    Wilfrido Vásquez M.D. [facility]  [facility]

## 2022-06-03 NOTE — PROGRESS NOTES
I have seen and examined the patient with the resident at the time of the appointment. The chart was reviewed. I agree with the assessment and plan. Care provided was reasonable and necessary

## 2022-06-08 RX ORDER — AMOXICILLIN 500 MG/1
500 CAPSULE ORAL EVERY 8 HOURS
Qty: 21 CAPSULE | Refills: 0 | Status: SHIPPED | OUTPATIENT
Start: 2022-06-08 | End: 2022-06-15

## 2022-06-08 NOTE — PROGRESS NOTES
Patient has dental infection. She has an appointment with the dentist tomorrow and is requesting a prescription for antibiotics until she can see the dentist. Shes allergic to sofa. Amoxil 500 Q8 hours times seven days was provided. She currently has no fever or nausea vomiting diarrhea chest pain shortness of breath or any other concerning symptoms.

## 2022-06-22 ENCOUNTER — PATIENT OUTREACH (OUTPATIENT)
Dept: ADMINISTRATIVE | Facility: HOSPITAL | Age: 67
End: 2022-06-22
Payer: MEDICAID

## 2022-06-22 NOTE — PROGRESS NOTES
Population Health. Out Reach.  The following record(s)  below were uploaded for Health Maintenance .        9/28/21 PAP SMEAR  9/28/21 HPV SCREENING   6/10/13 & 5/15/19 COLONOSCOPY

## 2022-07-20 ENCOUNTER — OFFICE VISIT (OUTPATIENT)
Dept: FAMILY MEDICINE | Facility: CLINIC | Age: 67
End: 2022-07-20
Payer: MEDICAID

## 2022-07-20 VITALS
TEMPERATURE: 98 F | OXYGEN SATURATION: 97 % | SYSTOLIC BLOOD PRESSURE: 103 MMHG | BODY MASS INDEX: 26.79 KG/M2 | HEIGHT: 65 IN | WEIGHT: 160.81 LBS | RESPIRATION RATE: 18 BRPM | HEART RATE: 77 BPM | DIASTOLIC BLOOD PRESSURE: 68 MMHG

## 2022-07-20 DIAGNOSIS — L72.0 EPIDERMAL INCLUSION CYST: ICD-10-CM

## 2022-07-20 DIAGNOSIS — F41.9 CHRONIC ANXIETY: Primary | ICD-10-CM

## 2022-07-20 DIAGNOSIS — I10 PRIMARY HYPERTENSION: ICD-10-CM

## 2022-07-20 DIAGNOSIS — L60.2 THICKENED NAILS: ICD-10-CM

## 2022-07-20 DIAGNOSIS — M54.41 CHRONIC BILATERAL LOW BACK PAIN WITH RIGHT-SIDED SCIATICA: ICD-10-CM

## 2022-07-20 DIAGNOSIS — F32.A MILD DEPRESSION: ICD-10-CM

## 2022-07-20 DIAGNOSIS — G89.29 CHRONIC BILATERAL LOW BACK PAIN WITH RIGHT-SIDED SCIATICA: ICD-10-CM

## 2022-07-20 PROBLEM — D72.819 LEUKOPENIA: Status: ACTIVE | Noted: 2022-07-20

## 2022-07-20 PROCEDURE — 99214 OFFICE O/P EST MOD 30 MIN: CPT | Mod: PBBFAC | Performed by: FAMILY MEDICINE

## 2022-07-20 RX ORDER — FLUTICASONE PROPIONATE 50 MCG
2 SPRAY, SUSPENSION (ML) NASAL 2 TIMES DAILY
COMMUNITY
Start: 2022-04-20 | End: 2022-09-21 | Stop reason: SDUPTHER

## 2022-07-20 NOTE — PROGRESS NOTES
Subjective:       Patient ID: Jennie Julio is a 66 y.o. female.    Chief Complaint: Follow-up, Mild Depression, and Pt with soft knot on neck area    HPI  Jennie is a 66-year-old established patient who presents today for follow-up of hypertension, situational depression, chronic anxiety, low back pain with degenerative disc disease and right lower extremity radiculopathy, hyperlipidemia, and varicose veins who presents today for follow-up.    Right lower extremity radiculopathy-  - pain well controlled after an extended period of physical therapy.   - noted some mild exacerbation of symptoms occasionally with increased activity but recovers quickly  Hypertension:  -compliant with her benazepril and her blood pressure is well controlled  Hyperlipidemia:   -Compliant with atorvastatin and aspirin 81 mg every other day  -Following a healthier diet/eating less/ moderate exercise 2-3 x week.   -Lost 10 lbs with recent new dentures  Chronic anxiety and situational depression  -well-controlled on Effexor XR 75 mg daily    Health Maintenance:  Cervical Cancer Screenin21 NIL ; HPV Negative  Colon Cancer Screenin/19 history of colon polyps; Repeat due   Breast Cancer Screenin21 Birads 1  Current Outpatient Medications on File Prior to Visit   Medication Sig Dispense Refill    atorvastatin (LIPITOR) 40 MG tablet TAKE ONE TABLET BY MOUTH EVERY DAY 30 tablet 5    benazepriL (LOTENSIN) 20 MG tablet TAKE ONE TABLET BY MOUTH EVERY DAY 30 tablet 5    fluticasone propionate (FLONASE) 50 mcg/actuation nasal spray 2 sprays by Each Nostril route Daily.      fluticasone propionate (FLONASE) 50 mcg/actuation nasal spray 2 sprays by Nasal route 2 (two) times a day.      gabapentin (NEURONTIN) 100 MG capsule TAKE ONE TABLET BY MOUTH IN THE MORNING, THEN TAKE ONE CAPSULE AT NOON, THEN TAKE THREE CAPSULES AT NIGHT 150 capsule 3    venlafaxine (EFFEXOR-XR) 75 MG 24 hr capsule TAKE ONE CAPSULE BY MOUTH EVERY DAY 30  "capsule 5    aspirin (ECOTRIN) 81 MG EC tablet Take 1 tablet by mouth Daily.      cyclobenzaprine (FLEXERIL) 10 MG tablet Take 1 tablet by mouth 3 (three) times daily as needed.              Review of Systems   Constitutional: Negative for appetite change, chills and fever.   HENT: Negative for nasal congestion, sinus pressure/congestion, sneezing and sore throat.    Respiratory: Negative for shortness of breath.    Cardiovascular: Negative for chest pain.   Gastrointestinal: Negative for abdominal pain and blood in stool.            Objective:      Vitals:    07/20/22 0840   BP: 103/68   Pulse: 77   Resp: 18   Temp: 98.2 °F (36.8 °C)   /68 (BP Location: Right arm, Patient Position: Sitting, BP Method: Medium (Automatic))   Pulse 77   Temp 98.2 °F (36.8 °C) (Oral)   Resp 18   Ht 5' 4.96" (1.65 m)   Wt 72.9 kg (160 lb 12.8 oz)   SpO2 97%   BMI 26.79 kg/m²       Physical Exam  HENT:      Head: Normocephalic.   Cardiovascular:      Rate and Rhythm: Normal rate and regular rhythm.      Pulses: Normal pulses.      Heart sounds: Normal heart sounds.     No friction rub. No gallop.   Pulmonary:      Effort: Pulmonary effort is normal. No respiratory distress.      Breath sounds: Normal breath sounds. No stridor. No wheezing, rhonchi or rales.   Musculoskeletal:      Right lower leg: No edema.      Left lower leg: No edema.   Neurological:      Mental Status: She is alert and oriented to person, place, and time.   Psychiatric:         Mood and Affect: Mood normal.         Behavior: Behavior normal.         Thought Content: Thought content normal. Thought content does not include homicidal or suicidal ideation.         Judgment: Judgment normal.       0.5 cm palpable nodule consistent with epidermal inclusion cyst    Latest Reference Range & Units 02/18/22 09:00 05/26/22 11:09   WBC 4.5 - 11.0  3.7    RBC 4.00 - 5.20  4.67    Hemoglobin 12.0 - 16.0  13.8    Hematocrit 35.0 - 46.0  42.0    MCV 80.0 - 100.0  " 89.9    MCH 26.0 - 34.0  29.6    MCHC 31.0 - 37.0  32.9    RDW 11.5 - 14.5  13.6    Platelets 130 - 400  298    MPV 7.4 - 10.4  9.0    Neut %  46    LYMPH %  34    Mono %  14    Eosinophil %  5    Basophil %  0    Immature Granulocytes  0    Gran # (ANC) 2.10 - 9.20  1.69    Neut # 2.10 - 9.20  1.69    Lymph # 0.6 - 4.6  1.3    Mono # 0.1 - 1.3  0.5    Eos # 0.0 - 0.9  0.2    Baso # 0.0 - 0.2  0.0    Immature Grans (Abs)  0.010    MO Blasts  40    nRBC 0.0 - 0.2  0.0    Low Event #  80    FLAG2  70    FLAG3  80    FLAGS  80    Sodium 136 - 145  139    Potassium 3.5 - 5.1  4.4    Chloride 98 - 107  107    CO2 23 - 31  26    BUN 9.8 - 20.1  12.0    Creatinine 0.55 - 1.02  0.66    eGFR if non  >=90  96    eGFR if  >=90  >105    Glucose 82 - 115  105    Calcium 8.7 - 10.5  9.0    Alkaline Phosphatase 40 - 150  73    PROTEIN TOTAL 5.8 - 7.6  7.0    Albumin 3.4 - 4.8  3.7    Albumin/Globulin Ratio 1.1 - 2.0  1.1    BILIRUBIN TOTAL <=1.5  0.6    Bilirubin, Direct 0.0 - 0.5  0.3    Bilirubin, Indirect 0.00 - 0.80  0.30    AST 5 - 34  19    ALT 0 - 55  15    Globulin, Total 2.4 - 3.5  3.3    Cholesterol <=200  149    HDL 35 - 60  58    LDL Cholesterol External 50.00 - 140.00  80.00    Total Cholesterol/HDL Ratio 0 - 5  3    Triglycerides 37 - 140  56    Very Low Density Lipoprotein  11    Thyroid Stimulating Hormone 0.3500 - 4.9400  1.7505    Hemoglobin A1C External <=7.0  5.3    Estimated Avg Glucose  105.4    Hepatitis B Surface Antigen Nonreactive   Nonreactive   Hepatitis C Ab Nonreactive   Nonreactive   HIV Nonreactive   Nonreactive       Assessment/Plan:  Chronic anxiety  Continue Effexor   Mild depression  See above   Primary hypertension  Continue Lotensin 20 mg q day   Chronic bilateral low back pain with right-sided sciatica  Continue gabapentin, Flexeril   Epidermal inclusion cyst  Nape of neck  Consider referral FM MS or remove at RTC   Thickened nails  Nail trimming done during  visit        Follow up in about 2 months (around 9/20/2022).

## 2022-09-14 RX ORDER — CAYENNE 450 MG
CAPSULE ORAL
COMMUNITY
Start: 2021-10-27

## 2022-09-20 ENCOUNTER — DOCUMENTATION ONLY (OUTPATIENT)
Dept: FAMILY MEDICINE | Facility: CLINIC | Age: 67
End: 2022-09-20
Payer: MEDICAID

## 2022-09-21 ENCOUNTER — OFFICE VISIT (OUTPATIENT)
Dept: FAMILY MEDICINE | Facility: CLINIC | Age: 67
End: 2022-09-21
Payer: MEDICAID

## 2022-09-21 VITALS
DIASTOLIC BLOOD PRESSURE: 83 MMHG | SYSTOLIC BLOOD PRESSURE: 129 MMHG | BODY MASS INDEX: 29.27 KG/M2 | TEMPERATURE: 99 F | WEIGHT: 155 LBS | OXYGEN SATURATION: 98 % | HEIGHT: 61 IN | RESPIRATION RATE: 20 BRPM

## 2022-09-21 VITALS
DIASTOLIC BLOOD PRESSURE: 83 MMHG | SYSTOLIC BLOOD PRESSURE: 129 MMHG | TEMPERATURE: 98 F | WEIGHT: 155 LBS | OXYGEN SATURATION: 99 % | HEIGHT: 65 IN | BODY MASS INDEX: 25.83 KG/M2 | RESPIRATION RATE: 18 BRPM | HEART RATE: 65 BPM

## 2022-09-21 DIAGNOSIS — Z12.39 ENCOUNTER FOR SCREENING FOR MALIGNANT NEOPLASM OF BREAST, UNSPECIFIED SCREENING MODALITY: ICD-10-CM

## 2022-09-21 DIAGNOSIS — F41.9 CHRONIC ANXIETY: Primary | ICD-10-CM

## 2022-09-21 DIAGNOSIS — Z23 NEED FOR INFLUENZA VACCINATION: ICD-10-CM

## 2022-09-21 DIAGNOSIS — I10 PRIMARY HYPERTENSION: ICD-10-CM

## 2022-09-21 DIAGNOSIS — M54.41 CHRONIC BILATERAL LOW BACK PAIN WITH RIGHT-SIDED SCIATICA: ICD-10-CM

## 2022-09-21 DIAGNOSIS — L72.0 EPIDERMAL INCLUSION CYST: Primary | ICD-10-CM

## 2022-09-21 DIAGNOSIS — G89.29 CHRONIC BILATERAL LOW BACK PAIN WITH RIGHT-SIDED SCIATICA: ICD-10-CM

## 2022-09-21 PROCEDURE — 99213 OFFICE O/P EST LOW 20 MIN: CPT | Mod: PBBFAC,25 | Performed by: FAMILY MEDICINE

## 2022-09-21 PROCEDURE — 99214 OFFICE O/P EST MOD 30 MIN: CPT | Mod: PBBFAC,27 | Performed by: FAMILY MEDICINE

## 2022-09-21 PROCEDURE — 11401 EXC TR-EXT B9+MARG 0.6-1 CM: CPT | Mod: PBBFAC | Performed by: FAMILY MEDICINE

## 2022-09-21 PROCEDURE — 90686 IIV4 VACC NO PRSV 0.5 ML IM: CPT | Mod: PBBFAC

## 2022-09-21 RX ORDER — LIDOCAINE HYDROCHLORIDE 10 MG/ML
1 INJECTION, SOLUTION EPIDURAL; INFILTRATION; INTRACAUDAL; PERINEURAL
Status: COMPLETED | OUTPATIENT
Start: 2022-09-21 | End: 2022-09-21

## 2022-09-21 RX ADMIN — LIDOCAINE HYDROCHLORIDE 10 MG: 10 INJECTION, SOLUTION EPIDURAL; INFILTRATION; INTRACAUDAL; PERINEURAL at 10:09

## 2022-09-21 NOTE — PROGRESS NOTES
"Subjective:       Patient ID: Jennie Julio is a 66 y.o. female.    Chief Complaint: Follow-up and Anxiety    HPI  Jennie is a 66-year-old established patient who presents today for follow-up of hypertension, situational depression, chronic anxiety, low back pain with degenerative disc disease and right lower extremity radiculopathy, hyperlipidemia, and varicose veins who presents today for follow-up.  Low back pain  Right lower extremity radiculopathy-  - pain previously well controlled after an extended period of physical therapy.   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   -Lost 15 lbs with recent new dentures/increased physical activity per patient  Chronic anxiety and situational depression  -well-controlled on Effexor XR 75 mg daily  New complaint:  Has cyst on back of neck and would like removed  Health Maintenance:  Cervical Cancer Screenin21 NIL ; HPV Negative  Colon Cancer Screenin/19 history of colon polyps; Repeat due   Breast Cancer Screenin21 Birads 1  Immunizations: Needs flu vaccine today     Review of Systems   Constitutional:  Negative for chills and fever.   Respiratory:  Negative for cough and shortness of breath.    Cardiovascular:  Negative for chest pain.   Gastrointestinal:  Negative for diarrhea, nausea and vomiting.          Objective:      Vitals:    22 0947   BP: 129/83   Pulse: 65   Resp: 18   Temp: 98.4 °F (36.9 °C)   /83 (BP Location: Right arm, Patient Position: Sitting, BP Method: Medium (Automatic))   Pulse 65   Temp 98.4 °F (36.9 °C) (Oral)   Resp 18   Ht 5' 5" " (1.651 m)   Wt 70.3 kg (155 lb)   SpO2 99%   BMI 25.79 kg/m²         Physical Exam  Constitutional:       General: She is not in acute distress.     Appearance: She is not ill-appearing, toxic-appearing or diaphoretic.   Pulmonary:      Effort: Pulmonary effort is normal.   Neurological:      Mental Status: She is alert.      Gait: Gait normal.   Psychiatric:         Mood and Affect: Mood normal.         Behavior: Behavior normal.         Thought Content: Thought content normal.      Comments: No SI/HI         Assessment/Plan:  Chronic anxiety  Continue Effexor XR 75 mg daily  Need for influenza vaccination  -     Influenza - Quadrivalent *Preferred* (6 months+) (PF)  Encounter for screening for malignant neoplasm of breast, unspecified screening modality  -     Mammo Digital Screening Bilat w/ Crescencio; Future; Expected date: 09/21/2022  Primary hypertension  Continue benazepril 20 mg daily  Chronic bilateral low back pain with right-sided sciatica  Consider PT if mild exacerbation persists may use p.r.n. Aleve/tramadol   Intentional weight loss 15 lb  Monitor closely  Follow up in about 2 months (around 11/21/2022).

## 2022-09-21 NOTE — PROGRESS NOTES
"Subjective:       Patient ID: Jennie Julio is a 66 y.o. female.    Chief Complaint: Cyst    HPI  67 yo referred to minor surgery for a cyst on the posterior aspect of her neck. She has "popped" it twice with white contents coming out of the cyst. No previous infection to the cyst. No other cysts at this time    Review of Systems  As per HPI         Objective:      Vitals:    09/21/22 1024   BP: 129/83   Resp: 20   Temp: 98.6 °F (37 °C)       Physical Exam      Gen: alert, no acute distress  Skin: posterior aspect of neck on left side- approximately 1 cm cyst with punctum present  Psych: cooperative, appropriate mood and affect      Procedure Note:  Procedure: epidermal inclusion cyst excision  Performed by: Navya Matta MD  Consent: signed consent obtained after discussion of risks, benefits, and alternative treatments  Description: Area cleaned with alcohol. 1% lidocaine used for anesthesia. The area was cleaned with chloraprep and then draped in a sterile fashion. A 3mm circular blade was used to make an incision over the punctum. Cyst wall and contents were removed with pick ups and curved iris scissors. Blood loss was <5cc. Hemostasis was achieved with gel foam and pressure.    The patient tolerated the procedure well with no complications.       Assessment/Plan:  Epidermal inclusion cyst    Other orders  -     LIDOcaine (PF) 10 mg/ml (1%) injection 10 mg    - excision of cyst today  - Post care instructions and return precautions discussed.     Follow up if symptoms worsen or fail to improve.            "

## 2022-11-07 ENCOUNTER — OFFICE VISIT (OUTPATIENT)
Dept: FAMILY MEDICINE | Facility: CLINIC | Age: 67
End: 2022-11-07
Payer: MEDICAID

## 2022-11-07 VITALS
WEIGHT: 153.81 LBS | DIASTOLIC BLOOD PRESSURE: 77 MMHG | BODY MASS INDEX: 29.04 KG/M2 | RESPIRATION RATE: 18 BRPM | SYSTOLIC BLOOD PRESSURE: 121 MMHG | TEMPERATURE: 99 F | HEIGHT: 61 IN | HEART RATE: 83 BPM

## 2022-11-07 DIAGNOSIS — M25.562 ACUTE PAIN OF LEFT KNEE: Primary | ICD-10-CM

## 2022-11-07 DIAGNOSIS — R00.2 INTERMITTENT PALPITATIONS: ICD-10-CM

## 2022-11-07 DIAGNOSIS — L40.9 PSORIASIS: ICD-10-CM

## 2022-11-07 DIAGNOSIS — L72.0 EPIDERMAL INCLUSION CYST: ICD-10-CM

## 2022-11-07 DIAGNOSIS — H91.90 HEARING LOSS, UNSPECIFIED HEARING LOSS TYPE, UNSPECIFIED LATERALITY: ICD-10-CM

## 2022-11-07 DIAGNOSIS — M79.605 LEFT LEG PAIN: ICD-10-CM

## 2022-11-07 LAB
ALBUMIN SERPL-MCNC: 3.9 GM/DL (ref 3.4–4.8)
ALBUMIN/GLOB SERPL: 1.1 RATIO (ref 1.1–2)
ALP SERPL-CCNC: 84 UNIT/L (ref 40–150)
ALT SERPL-CCNC: 8 UNIT/L (ref 0–55)
AST SERPL-CCNC: 13 UNIT/L (ref 5–34)
BASOPHILS # BLD AUTO: 0.03 X10(3)/MCL (ref 0–0.2)
BASOPHILS NFR BLD AUTO: 0.5 %
BILIRUBIN DIRECT+TOT PNL SERPL-MCNC: 0.4 MG/DL
BUN SERPL-MCNC: 17.7 MG/DL (ref 9.8–20.1)
CALCIUM SERPL-MCNC: 10.1 MG/DL (ref 8.4–10.2)
CHLORIDE SERPL-SCNC: 103 MMOL/L (ref 98–107)
CO2 SERPL-SCNC: 30 MMOL/L (ref 23–31)
CREAT SERPL-MCNC: 0.81 MG/DL (ref 0.55–1.02)
EOSINOPHIL # BLD AUTO: 0.18 X10(3)/MCL (ref 0–0.9)
EOSINOPHIL NFR BLD AUTO: 3.1 %
ERYTHROCYTE [DISTWIDTH] IN BLOOD BY AUTOMATED COUNT: 13.8 % (ref 11.5–17)
GFR SERPLBLD CREATININE-BSD FMLA CKD-EPI: >60 MLS/MIN/1.73/M2
GLOBULIN SER-MCNC: 3.4 GM/DL (ref 2.4–3.5)
GLUCOSE SERPL-MCNC: 78 MG/DL (ref 82–115)
HCT VFR BLD AUTO: 43 % (ref 37–47)
HGB BLD-MCNC: 13.9 GM/DL (ref 12–16)
IMM GRANULOCYTES # BLD AUTO: 0.02 X10(3)/MCL (ref 0–0.04)
IMM GRANULOCYTES NFR BLD AUTO: 0.3 %
LYMPHOCYTES # BLD AUTO: 2.2 X10(3)/MCL (ref 0.6–4.6)
LYMPHOCYTES NFR BLD AUTO: 37.3 %
MAGNESIUM SERPL-MCNC: 2.3 MG/DL (ref 1.6–2.6)
MCH RBC QN AUTO: 28.7 PG (ref 27–31)
MCHC RBC AUTO-ENTMCNC: 32.3 MG/DL (ref 33–36)
MCV RBC AUTO: 88.8 FL (ref 80–94)
MONOCYTES # BLD AUTO: 0.62 X10(3)/MCL (ref 0.1–1.3)
MONOCYTES NFR BLD AUTO: 10.5 %
NEUTROPHILS # BLD AUTO: 2.9 X10(3)/MCL (ref 2.1–9.2)
NEUTROPHILS NFR BLD AUTO: 48.3 %
NRBC BLD AUTO-RTO: 0 %
PLATELET # BLD AUTO: 330 X10(3)/MCL (ref 130–400)
PMV BLD AUTO: 9.6 FL (ref 7.4–10.4)
POTASSIUM SERPL-SCNC: 4.4 MMOL/L (ref 3.5–5.1)
PROT SERPL-MCNC: 7.3 GM/DL (ref 5.8–7.6)
RBC # BLD AUTO: 4.84 X10(6)/MCL (ref 4.2–5.4)
SODIUM SERPL-SCNC: 140 MMOL/L (ref 136–145)
TSH SERPL-ACNC: 1.41 UIU/ML (ref 0.35–4.94)
WBC # SPEC AUTO: 5.9 X10(3)/MCL (ref 4.5–11.5)

## 2022-11-07 PROCEDURE — 84443 ASSAY THYROID STIM HORMONE: CPT | Performed by: FAMILY MEDICINE

## 2022-11-07 PROCEDURE — 85025 COMPLETE CBC W/AUTO DIFF WBC: CPT | Performed by: FAMILY MEDICINE

## 2022-11-07 PROCEDURE — 80053 COMPREHEN METABOLIC PANEL: CPT | Performed by: FAMILY MEDICINE

## 2022-11-07 PROCEDURE — 36415 COLL VENOUS BLD VENIPUNCTURE: CPT | Performed by: FAMILY MEDICINE

## 2022-11-07 PROCEDURE — 99214 OFFICE O/P EST MOD 30 MIN: CPT | Mod: PBBFAC | Performed by: FAMILY MEDICINE

## 2022-11-07 PROCEDURE — 83735 ASSAY OF MAGNESIUM: CPT | Performed by: FAMILY MEDICINE

## 2022-11-07 RX ORDER — CLOBETASOL PROPIONATE 0.5 MG/G
OINTMENT TOPICAL 2 TIMES DAILY
Qty: 30 G | Refills: 1 | Status: SHIPPED | OUTPATIENT
Start: 2022-11-07 | End: 2022-12-13 | Stop reason: ALTCHOICE

## 2022-11-07 NOTE — PROGRESS NOTES
Subjective:       Patient ID: Jennie Julio is a 66 y.o. female.    Chief Complaint: Follow-up, Hypertension, Cyst to neck, Left knee pain and occational swelling, and Pt with Clobetasol Propionate Ointment 0.05% Requesting ref    Follow-up  Associated symptoms include arthralgias and neck pain. Pertinent negatives include no chest pain, headaches, joint swelling, vomiting or weakness.   Hypertension  Associated symptoms include neck pain and palpitations. Pertinent negatives include no chest pain or headaches.     HPI  Established patient with hypertension, osteoarthritis, chronic low back pain who presents today for follow-up.  New complaints:  1. Reports concern for hearing loss-when her sister speaks to her she has asked her to repeat herself multiple times.  She it appears to be worsening.  2. Occasional palpitations; feels like HR races at times .  Worsens with activity.  Improves with rest.  Present time several weeks.  Denies chest pain, shortness of breath, legs  3. Left knee pain x 1 week ; no recent injury ; prior injury HS/college related to basketball  Recalls a twisting sensation 2-3 months ago while playing catch with dogs but did not bother to recently.  No fever, chills  4. 2mm nodule left neck- suspect early epidermal inclusion cyst.  Slightly lateral to prior epidermal inclusion cyst.  5. Neck pain is intermittent and activity dependent.  It is not bothering her today.      Review of Systems   Constitutional:  Negative for activity change and unexpected weight change.   HENT:  Positive for hearing loss. Negative for rhinorrhea and trouble swallowing.    Eyes:  Negative for discharge and visual disturbance.   Respiratory:  Negative for chest tightness and wheezing.    Cardiovascular:  Positive for palpitations. Negative for chest pain.   Gastrointestinal:  Negative for blood in stool, constipation, diarrhea and vomiting.   Endocrine: Negative for polydipsia and polyuria.   Genitourinary:  Negative for  "difficulty urinating, dysuria, hematuria and menstrual problem.   Musculoskeletal:  Positive for arthralgias and neck pain. Negative for joint swelling.   Neurological:  Negative for weakness and headaches.   Psychiatric/Behavioral:  Negative for confusion and dysphoric mood.           Objective:      Vitals:    11/07/22 1341   BP: 121/77   Pulse: 83   Resp: 18   Temp: 99.1 °F (37.3 °C)   /77 (BP Location: Right arm, Patient Position: Sitting, BP Method: Medium (Automatic))   Pulse 83   Temp 99.1 °F (37.3 °C) (Oral)   Resp 18   Ht 5' 1" (1.549 m)   Wt 69.8 kg (153 lb 12.8 oz)   BMI 29.06 kg/m²       Physical Exam  Musculoskeletal:      Left knee: Swelling, deformity and crepitus present. Tenderness present over the lateral joint line. No LCL, ACL or PCL tenderness. No LCL laxity, MCL laxity, ACL laxity or PCL laxity.     Right lower leg: Edema (trace) present.      Left lower leg: Edema (trace edema) present.   Neurological:      Mental Status: She is alert and oriented to person, place, and time.         Assessment/Plan:  Acute pain of left knee  -     X-Ray Knee Complete 4 or More Views Left; Future; Expected date: 11/07/2022  -      may use p.r.n. acetaminophen preferentially- ibuprofen/naproxen for pain unrelieved by acetaminophen  Hearing loss, unspecified hearing loss type, unspecified laterality  -     Ambulatory referral/consult to Audiology; Future; Expected date: 11/14/2022  Intermittent palpitations/Venous insufficiency   -     TSH  -     Holter monitor - 48 hour  -     CBC Auto Differential  -     Comprehensive Metabolic Panel  -     Magnesium  -     Appt with Dr. Combs/ January 24, 2022 250 PM   -     US LLE Venous US to rule out DVT   Psoriasis  -     refilled clobetasol 0.05% (TEMOVATE) 0.05 % Oint; Apply topically 2 (two) times daily.  Dispense: 30 g; Refill: 1  Epidermal inclusion cyst-suspected 2 mm in size         -  Will continue to monitor  as it is too small to remove  Health " Maintenance         -     COVID-19 vaccination recommended ; discussed risks benefits with patient  Follow up in about 4 weeks (around 12/5/2022).

## 2022-11-08 ENCOUNTER — HOSPITAL ENCOUNTER (OUTPATIENT)
Dept: RADIOLOGY | Facility: HOSPITAL | Age: 67
Discharge: HOME | End: 2022-11-08
Attending: FAMILY MEDICINE
Payer: MEDICAID

## 2022-11-08 ENCOUNTER — TELEPHONE (OUTPATIENT)
Dept: FAMILY MEDICINE | Facility: CLINIC | Age: 67
End: 2022-11-08
Payer: MEDICAID

## 2022-11-08 DIAGNOSIS — M25.562 ACUTE PAIN OF LEFT KNEE: ICD-10-CM

## 2022-11-08 DIAGNOSIS — M79.605 LEFT LEG PAIN: ICD-10-CM

## 2022-11-08 PROCEDURE — 93971 EXTREMITY STUDY: CPT | Mod: LT

## 2022-11-08 PROCEDURE — 73564 X-RAY EXAM KNEE 4 OR MORE: CPT | Mod: TC,LT

## 2022-11-08 NOTE — TELEPHONE ENCOUNTER
CMP, CBC, Mag and TSH reviewed.  No significant abnormalities noted.  Message left with patient for return phone call on voicemail.      Addendum:  Patient return phone call and lab results reviewed in detail.

## 2022-12-05 ENCOUNTER — OFFICE VISIT (OUTPATIENT)
Dept: FAMILY MEDICINE | Facility: CLINIC | Age: 67
End: 2022-12-05
Payer: MEDICAID

## 2022-12-05 VITALS
WEIGHT: 158 LBS | BODY MASS INDEX: 26.33 KG/M2 | HEART RATE: 85 BPM | RESPIRATION RATE: 18 BRPM | TEMPERATURE: 100 F | OXYGEN SATURATION: 98 % | HEIGHT: 65 IN | DIASTOLIC BLOOD PRESSURE: 79 MMHG | SYSTOLIC BLOOD PRESSURE: 132 MMHG

## 2022-12-05 DIAGNOSIS — J06.9 UPPER RESPIRATORY TRACT INFECTION, UNSPECIFIED TYPE: Primary | ICD-10-CM

## 2022-12-05 LAB
FLUAV AG UPPER RESP QL IA.RAPID: NOT DETECTED
FLUBV AG UPPER RESP QL IA.RAPID: NOT DETECTED
RSV A 5' UTR RNA NPH QL NAA+PROBE: NOT DETECTED
SARS-COV-2 RNA RESP QL NAA+PROBE: NOT DETECTED
STREP A PCR (OHS): NOT DETECTED

## 2022-12-05 PROCEDURE — 0241U COVID/RSV/FLU A&B PCR: CPT

## 2022-12-05 PROCEDURE — 87651 STREP A DNA AMP PROBE: CPT

## 2022-12-05 PROCEDURE — 99215 OFFICE O/P EST HI 40 MIN: CPT | Mod: PBBFAC | Performed by: NURSE PRACTITIONER

## 2022-12-05 RX ORDER — BENZONATATE 100 MG/1
100 CAPSULE ORAL 3 TIMES DAILY PRN
Qty: 30 CAPSULE | Refills: 0 | Status: SHIPPED | OUTPATIENT
Start: 2022-12-05 | End: 2022-12-15

## 2022-12-05 NOTE — PROGRESS NOTES
"  Family Medicine Clinic Note:    PCP: Soni Montano MD    Jennie Julio is a 67 y.o. female presents to clinic today for cough      Subjective:   URI   Onset yesterday  +scratchy throat with increase pain  +dry cough with chest pain only with the cough  -No SOB   -No sick contacts  -Is taking  Flonase     -Denies N/V/d or abd pain or fever  +Ear pain with swallowing or coughing   -A non smoker and around second hand smoke via a pipe 1-2 times a week       ROS  Constitutional: No fevers, chills or fatigue  Eye:no change in vision  ENMT: see HPI  Respiratory: no trouble breathing or SOB  Cardiovascular: no chest tightness, no SOB on activity, no ankle swelling    Current Outpatient Medications   Medication Sig Dispense Refill    aspirin (ECOTRIN) 81 MG EC tablet Take 1 tablet by mouth Daily.      atorvastatin (LIPITOR) 40 MG tablet TAKE ONE TABLET BY MOUTH EVERY DAY 30 tablet 5    benazepriL (LOTENSIN) 20 MG tablet TAKE ONE TABLET BY MOUTH EVERY DAY 30 tablet 5    clobetasol 0.05% (TEMOVATE) 0.05 % Oint Apply topically 2 (two) times daily. 30 g 1    coenzyme Q10 100 mg Chew Take by mouth.      cyclobenzaprine (FLEXERIL) 10 MG tablet Take 1 tablet by mouth 3 (three) times daily as needed.      fluticasone propionate (FLONASE) 50 mcg/actuation nasal spray 2 sprays by Each Nostril route Daily.      gabapentin (NEURONTIN) 100 MG capsule TAKE ONE CAPSULE BY MOUTH EVERY MORNING , TAKE ONE CAPSULE BY MOUTH EVERY NOON, THEN TAKE THREE CAPSULES BY MOUTH AT BEDTIME 150 capsule 3    mv-mn-folic-lutein-herbal 293 (ALIVE WOMEN'S 50 PLUS GUMMY) 120 mcg-150 mcg -37.5 mg Chew Take by mouth.      venlafaxine (EFFEXOR-XR) 75 MG 24 hr capsule TAKE ONE CAPSULE BY MOUTH EVERY DAY 30 capsule 5     No current facility-administered medications for this visit.       Objective:     /79 (BP Location: Left arm, Patient Position: Sitting, BP Method: Medium (Automatic))   Pulse 85   Temp 99.5 °F (37.5 °C) (Oral)   Resp 18   Ht 5' 5" (1.651 " m)   Wt 71.7 kg (158 lb)   SpO2 98%   BMI 26.29 kg/m²   BP Readings from Last 3 Encounters:   12/05/22 132/79   11/07/22 121/77   09/21/22 129/83     Wt Readings from Last 3 Encounters:   12/05/22 71.7 kg (158 lb)   11/07/22 69.8 kg (153 lb 12.8 oz)   09/21/22 70.3 kg (155 lb)     No results found for this or any previous visit (from the past 200 hour(s)).  Body mass index is 26.29 kg/m².    Physical Exam  Constitutional: NAD  ENT: +lymphadenopathy celso, No thyromegaly. +nasal bogginess, no maxillary tenderness  +exudate noted ---no tonsils may be food particles? TM clear celso with appropriate landmarks. No erythema, bulging or effusions.  Lungs: CTAB, No crackles, No wheezes  Cardiovascular: Normal heart sounds, No MRG        The 10-year ASCVD risk score (Neha BARKER, et al., 2019) is: 6.6%*    Values used to calculate the score:      Age: 67 years      Sex: Female      Is Non- : No      Diabetic: No      Tobacco smoker: No      Systolic Blood Pressure: 132 mmHg      Is BP treated: Yes      HDL Cholesterol: 58 mg/dL*      Total Cholesterol: 149 mg/dL*      * - Cholesterol units were assumed for this score calculation      Assessment:   Jennie Julio is a 67 y.o. female with:    No diagnosis found.  AR  Plan:   -Ordered COVID/RSV/Flu PCR. No work until pt has negative result for COVID  -Ordered strep Group A PCR test  -Ordered Zyrtec at night  -Continue the Flonase  -Ordered Tessalon perles 100mg TID prn x 10 days  -If this does not resolved or becomes chronic  consider PFT to rule out any underlying COPD  due to second hand smoking history       RTC in 2 weeks         No follow-ups on file.    Future Appointments   Date Time Provider Department Center   12/7/2022  8:30 AM AUDIOLOGIST 1, [facility] AUDIOLOGY [facility] AUDIO [place]    12/7/2022  9:00 AM CV [facility] CARDIAC MONITOR 01 [facility] CARDIA [place]    12/9/2022 10:45 AM [facility] MAMMO2 [facility] MAMMO [place]    12/14/2022  8:30 AM Soni Montano MD [facility]   RES [place]        Staff: Dr. Armando Miller MD  Family Medicine PGY-2

## 2022-12-06 NOTE — PROGRESS NOTES
I have discussed the case with the resident and reviewed the resident's history and physical, assessment, plan, and progress note. I agree with the findings.       Dom Roberts MD  [facility]

## 2022-12-07 ENCOUNTER — HOSPITAL ENCOUNTER (OUTPATIENT)
Dept: CARDIOLOGY | Facility: HOSPITAL | Age: 67
Discharge: HOME | End: 2022-12-07
Attending: FAMILY MEDICINE
Payer: MEDICAID

## 2022-12-07 DIAGNOSIS — R00.2 INTERMITTENT PALPITATIONS: ICD-10-CM

## 2022-12-07 PROCEDURE — 93226 XTRNL ECG REC<48 HR SCAN A/R: CPT

## 2022-12-09 ENCOUNTER — HOSPITAL ENCOUNTER (OUTPATIENT)
Dept: RADIOLOGY | Facility: HOSPITAL | Age: 67
Discharge: HOME | End: 2022-12-09
Attending: FAMILY MEDICINE
Payer: MEDICARE

## 2022-12-09 DIAGNOSIS — Z12.39 ENCOUNTER FOR SCREENING FOR MALIGNANT NEOPLASM OF BREAST, UNSPECIFIED SCREENING MODALITY: ICD-10-CM

## 2022-12-09 PROCEDURE — 77063 MAMMO DIGITAL SCREENING BILAT WITH TOMO: ICD-10-PCS | Mod: 26,,, | Performed by: RADIOLOGY

## 2022-12-09 PROCEDURE — 77063 BREAST TOMOSYNTHESIS BI: CPT | Mod: 26,,, | Performed by: RADIOLOGY

## 2022-12-09 PROCEDURE — 77067 SCR MAMMO BI INCL CAD: CPT | Mod: TC

## 2022-12-09 PROCEDURE — 77067 MAMMO DIGITAL SCREENING BILAT WITH TOMO: ICD-10-PCS | Mod: 26,,, | Performed by: RADIOLOGY

## 2022-12-09 PROCEDURE — 77067 SCR MAMMO BI INCL CAD: CPT | Mod: 26,,, | Performed by: RADIOLOGY

## 2022-12-13 ENCOUNTER — OFFICE VISIT (OUTPATIENT)
Dept: FAMILY MEDICINE | Facility: CLINIC | Age: 67
End: 2022-12-13
Payer: MEDICARE

## 2022-12-13 ENCOUNTER — HOSPITAL ENCOUNTER (OUTPATIENT)
Dept: RADIOLOGY | Facility: HOSPITAL | Age: 67
Discharge: HOME | End: 2022-12-13
Attending: FAMILY MEDICINE
Payer: MEDICARE

## 2022-12-13 VITALS
TEMPERATURE: 99 F | WEIGHT: 151 LBS | RESPIRATION RATE: 18 BRPM | OXYGEN SATURATION: 96 % | HEIGHT: 65 IN | DIASTOLIC BLOOD PRESSURE: 69 MMHG | BODY MASS INDEX: 25.16 KG/M2 | HEART RATE: 76 BPM | SYSTOLIC BLOOD PRESSURE: 106 MMHG

## 2022-12-13 DIAGNOSIS — L60.3 DYSTROPHIC NAIL: ICD-10-CM

## 2022-12-13 DIAGNOSIS — J01.90 ACUTE NON-RECURRENT SINUSITIS, UNSPECIFIED LOCATION: Primary | ICD-10-CM

## 2022-12-13 DIAGNOSIS — R05.3 PERSISTENT COUGH: ICD-10-CM

## 2022-12-13 DIAGNOSIS — M17.0 PRIMARY OSTEOARTHRITIS OF BOTH KNEES: ICD-10-CM

## 2022-12-13 DIAGNOSIS — J02.9 PHARYNGITIS, UNSPECIFIED ETIOLOGY: ICD-10-CM

## 2022-12-13 DIAGNOSIS — Z87.09 HISTORY OF BRONCHIECTASIS: ICD-10-CM

## 2022-12-13 DIAGNOSIS — Z87.01 HISTORY OF PNEUMONIA: ICD-10-CM

## 2022-12-13 LAB
ALBUMIN SERPL-MCNC: 3.6 GM/DL (ref 3.4–4.8)
ALBUMIN/GLOB SERPL: 1 RATIO (ref 1.1–2)
ALP SERPL-CCNC: 89 UNIT/L (ref 40–150)
ALT SERPL-CCNC: 10 UNIT/L (ref 0–55)
AST SERPL-CCNC: 15 UNIT/L (ref 5–34)
BASOPHILS # BLD AUTO: 0.03 X10(3)/MCL (ref 0–0.2)
BASOPHILS NFR BLD AUTO: 0.4 %
BILIRUBIN DIRECT+TOT PNL SERPL-MCNC: 0.7 MG/DL
BUN SERPL-MCNC: 12.7 MG/DL (ref 9.8–20.1)
CALCIUM SERPL-MCNC: 9.6 MG/DL (ref 8.4–10.2)
CHLORIDE SERPL-SCNC: 105 MMOL/L (ref 98–107)
CO2 SERPL-SCNC: 28 MMOL/L (ref 23–31)
CREAT SERPL-MCNC: 0.71 MG/DL (ref 0.55–1.02)
EOSINOPHIL # BLD AUTO: 0.24 X10(3)/MCL (ref 0–0.9)
EOSINOPHIL NFR BLD AUTO: 3 %
ERYTHROCYTE [DISTWIDTH] IN BLOOD BY AUTOMATED COUNT: 14 % (ref 11.5–17)
FLUAV AG UPPER RESP QL IA.RAPID: NOT DETECTED
FLUBV AG UPPER RESP QL IA.RAPID: NOT DETECTED
GFR SERPLBLD CREATININE-BSD FMLA CKD-EPI: >60 MLS/MIN/1.73/M2
GLOBULIN SER-MCNC: 3.7 GM/DL (ref 2.4–3.5)
GLUCOSE SERPL-MCNC: 93 MG/DL (ref 82–115)
HCT VFR BLD AUTO: 41.4 % (ref 37–47)
HGB BLD-MCNC: 13.4 GM/DL (ref 12–16)
IMM GRANULOCYTES # BLD AUTO: 0.02 X10(3)/MCL (ref 0–0.04)
IMM GRANULOCYTES NFR BLD AUTO: 0.3 %
LYMPHOCYTES # BLD AUTO: 1.38 X10(3)/MCL (ref 0.6–4.6)
LYMPHOCYTES NFR BLD AUTO: 17.4 %
MCH RBC QN AUTO: 28.6 PG (ref 27–31)
MCHC RBC AUTO-ENTMCNC: 32.4 MG/DL (ref 33–36)
MCV RBC AUTO: 88.5 FL (ref 80–94)
MONOCYTES # BLD AUTO: 0.85 X10(3)/MCL (ref 0.1–1.3)
MONOCYTES NFR BLD AUTO: 10.7 %
NEUTROPHILS # BLD AUTO: 5.4 X10(3)/MCL (ref 2.1–9.2)
NEUTROPHILS NFR BLD AUTO: 68.2 %
NRBC BLD AUTO-RTO: 0 %
PLATELET # BLD AUTO: 358 X10(3)/MCL (ref 130–400)
PMV BLD AUTO: 9.6 FL (ref 7.4–10.4)
POTASSIUM SERPL-SCNC: 4.5 MMOL/L (ref 3.5–5.1)
PROT SERPL-MCNC: 7.3 GM/DL (ref 5.8–7.6)
RBC # BLD AUTO: 4.68 X10(6)/MCL (ref 4.2–5.4)
RSV A 5' UTR RNA NPH QL NAA+PROBE: NOT DETECTED
SARS-COV-2 RNA RESP QL NAA+PROBE: NOT DETECTED
SODIUM SERPL-SCNC: 139 MMOL/L (ref 136–145)
STREP A PCR (OHS): NOT DETECTED
WBC # SPEC AUTO: 7.9 X10(3)/MCL (ref 4.5–11.5)

## 2022-12-13 PROCEDURE — 71046 X-RAY EXAM CHEST 2 VIEWS: CPT | Mod: TC

## 2022-12-13 PROCEDURE — 87651 STREP A DNA AMP PROBE: CPT

## 2022-12-13 PROCEDURE — 99215 OFFICE O/P EST HI 40 MIN: CPT | Mod: PBBFAC

## 2022-12-13 PROCEDURE — 0241U COVID/RSV/FLU A&B PCR: CPT

## 2022-12-13 PROCEDURE — 80053 COMPREHEN METABOLIC PANEL: CPT

## 2022-12-13 PROCEDURE — 36415 COLL VENOUS BLD VENIPUNCTURE: CPT

## 2022-12-13 PROCEDURE — 85025 COMPLETE CBC W/AUTO DIFF WBC: CPT

## 2022-12-13 RX ORDER — TERBINAFINE HYDROCHLORIDE 250 MG/1
250 TABLET ORAL DAILY
COMMUNITY
Start: 2022-12-05 | End: 2023-07-24 | Stop reason: ALTCHOICE

## 2022-12-13 RX ORDER — DOXYCYCLINE HYCLATE 100 MG
100 TABLET ORAL EVERY 12 HOURS
Qty: 20 TABLET | Refills: 0 | Status: SHIPPED | OUTPATIENT
Start: 2022-12-13 | End: 2023-01-18 | Stop reason: ALTCHOICE

## 2022-12-13 NOTE — PROGRESS NOTES
"Subjective:       Patient ID: Jennie Julio is a 67 y.o. female.    Chief Complaint: Follow-up (Pt stated throat continues to be "raw" and sore. ) and Cough  History of the present illness:  Jennie is an established patient who presents today with complaint of ear pain, nasal congestion, productive cough  with thick yellow-green sputum, shortness of breath, chest pain with coughing, sore throat and postnasal drip.  Symptoms began 12/04/2022.  Patient presented to walk-in clinic at [facility] and was diagnosed with allergic rhinitis versus URI.  Rapid strep, COVID flu and RSV testing were performed however patient reports that the swabs were not properly collected.  The nurse swabbed her cheeks not her throat and barely entered her nares with the COVID RSV flu swab.She was prescribed Zyrtec/Flonase/Tessalon Perles.  To her continued symptoms, she would like to be swabbed again.  Her cough is frequent and associated with worsening sore throat and chest pain.  It occurs every few minutes. Her past medical history is significant for bronchiectasis and pneumonia.  He has not had a recent pulmonary function test.  Additional complaint:  Discolored/thickened toenails-she saw a podiatrist who said because she did not have diabetes he could not tremor nails.  He started her on Lamisil but she has not taken it yet.  He did not culture her nails.  Past Medical History:   Diagnosis Date    Chronic anxiety 5/16/2022    Dystrophic nail 12/13/2022    History of pneumonia 12/13/2022    Low back pain with sciatica 5/16/2022    Mild depression 5/16/2022    Mitral valve prolapse 5/16/2022    Mixed hyperlipidemia 5/16/2022    Osteoarthritis of ankle 5/16/2022    Personal history of colonic polyps 06/10/2013    Primary hypertension 5/16/2022    Varicose veins of lower extremity 5/16/2022     Current Outpatient Medications on File Prior to Visit   Medication Sig    aspirin (ECOTRIN) 81 MG EC tablet Take 1 tablet by mouth Daily.    " "atorvastatin (LIPITOR) 40 MG tablet TAKE ONE TABLET BY MOUTH EVERY DAY    benazepriL (LOTENSIN) 20 MG tablet TAKE ONE TABLET BY MOUTH EVERY DAY    benzonatate (TESSALON) 100 MG capsule Take 1 capsule (100 mg total) by mouth 3 (three) times daily as needed for Cough.    coenzyme Q10 100 mg Chew Take by mouth.    fluticasone propionate (FLONASE) 50 mcg/actuation nasal spray 2 sprays by Each Nostril route Daily.    gabapentin (NEURONTIN) 100 MG capsule TAKE ONE CAPSULE BY MOUTH EVERY MORNING , TAKE ONE CAPSULE BY MOUTH EVERY NOON, THEN TAKE THREE CAPSULES BY MOUTH AT BEDTIME    mv-mn-folic-lutein-herbal 293 (ALIVE WOMEN'S 50 PLUS GUMMY) 120 mcg-150 mcg -37.5 mg Chew Take by mouth.    venlafaxine (EFFEXOR-XR) 75 MG 24 hr capsule TAKE ONE CAPSULE BY MOUTH EVERY DAY    cyclobenzaprine (FLEXERIL) 10 MG tablet Take 1 tablet by mouth 3 (three) times daily as needed.    terbinafine HCL (LAMISIL) 250 mg tablet Take 250 mg by mouth once daily.    [DISCONTINUED] clobetasol 0.05% (TEMOVATE) 0.05 % Oint Apply topically 2 (two) times daily.           Review of Systems   HENT:  Positive for ear pain, postnasal drip and sore throat.    Respiratory:  Positive for cough and shortness of breath.    Cardiovascular:  Positive for chest pain.          Objective:      Vitals:    12/13/22 0812   BP: 106/69   Pulse: 76   Resp: 18   Temp: 98.6 °F (37 °C)   /69 (BP Location: Right arm, Patient Position: Sitting, BP Method: Medium (Automatic))   Pulse 76   Temp 98.6 °F (37 °C) (Oral)   Resp 18   Ht 5' 5" (1.651 m)   Wt 68.5 kg (151 lb)   SpO2 96%   BMI 25.13 kg/m²       Physical Exam  Constitutional:       General: She is not in acute distress.     Appearance: She is normal weight. She is not ill-appearing or toxic-appearing.   HENT:      Nose: Congestion present.      Mouth/Throat:      Mouth: Mucous membranes are moist.      Pharynx: Posterior oropharyngeal erythema present. No oropharyngeal exudate.   Eyes:      " Conjunctiva/sclera: Conjunctivae normal.   Cardiovascular:      Rate and Rhythm: Normal rate and regular rhythm.   Pulmonary:      Effort: Pulmonary effort is normal. No respiratory distress.      Breath sounds: Rhonchi present. No wheezing or rales.   Musculoskeletal:      Right lower leg: No edema.      Left lower leg: No edema.   Neurological:      Mental Status: She is alert.     EXAMINATION:  XR KNEE COMP 4 OR MORE VIEWS LEFT     CLINICAL HISTORY:  left knee pain;Pain in left knee     COMPARISON:  None.     FINDINGS:  No acute displaced fractures or dislocations.     There is narrowing of the medial and lateral compartments of the knee joint with presence of marginal osteophytes articular spaces are otherwise preserved with smooth articular surfaces     No blastic or lytic lesions.     Soft tissues within normal limits.     Impression:     Degenerative changes.        Electronically signed by: Curt Golden  Date:                                            11/08/2022  Time:                                           14:01    Assessment/Plan:  Acute non-recurrent sinusitis, likely secondary to recent upper respiratory infection  -     doxycycline (VIBRA-TABS) 100 MG tablet; Take 1 tablet (100 mg total) by mouth every 12 (twelve) hours.  Dispense: 20 tablet; Refill: 0  Persistent cough  History of pneumonia  History of bronchiectasis  -     COVID/RSV/FLU A&B PCR  -     X-Ray Chest PA And Lateral  -     CBC Auto Differential  -      consider PFTs at baseline  -      PCV 20 when patient is feeling better.  Dystrophic nail  -     Comprehensive Metabolic Panel;  Pharyngitis, unspecified etiology  -     Strep Group A by PCR; Future; Expected date: 12/13/2022  -     CBC Auto Differential; Future; Expected date: 12/13/2022    OA Knees  Images reviewed  Further discussion next visit        Follow up in about 4 weeks (around 1/10/2023).

## 2022-12-16 LAB
OHS CV EVENT MONITOR DAY: 0
OHS CV HOLTER LENGTH DECIMAL HOURS: 48
OHS CV HOLTER LENGTH HOURS: 48
OHS CV HOLTER LENGTH MINUTES: 0
OHS CV HOLTER SINUS AVERAGE HR: 90
OHS CV HOLTER SINUS MAX HR: 129
OHS CV HOLTER SINUS MIN HR: 68

## 2022-12-29 ENCOUNTER — TELEPHONE (OUTPATIENT)
Dept: FAMILY MEDICINE | Facility: CLINIC | Age: 67
End: 2022-12-29
Payer: MEDICARE

## 2022-12-29 NOTE — TELEPHONE ENCOUNTER
Results of Holter monitor reviewed with patient in detail.  Average heart rate approximately 90.  Patient occasionally has palpitations.  Patient had echocardiogram 07/16/2021 at [facility] with normal left ventricular size and function and EF of 65%.  Additionally mild calcification of aortic valve noted and mild TR.  Pulmonary artery systolic pressure 25 mm Hg.  Patient has upcoming appointment January of 2023 with Dr. Combs January 24, 2022 at 2:50 p.m. we discussed considering a low-dose beta-blocker such as metoprolol XL.  At this time she wishes to defer until her appointment.    Holter monitor  Underlying rhythm:   Sinus  Arrythmia:  Average HR is borderline elevated at 90.  Consider evaluation for causes of an increased HR.   Pauses:  No significant pause noted  Symptoms:  During symptom of heart racing the patient is in normal sinus rhythm or sinus tachycardia  Episode of Heart racing reported. The corresponding rhythm strips revealed the following: the rhythm was sinus tachycardia at 110 bpm.  Rhythm    Predominant Rhythm  Sinus rhythm with heart rates varying between 68 and 129 BPM with an average of 90BPM.     PVC    Ventricular Arrhythmias  There were very rare PVCs totalling 93 and averaging 1.94 per hour.     VT    There were 0 runs.     PAC    Supraventricular Arrhythmias  There were very rare PACs totalling 90 and averaging 1.88 per hour.     SVT    There were 0 runs

## 2023-01-06 DIAGNOSIS — J06.9 URI, ACUTE: Primary | ICD-10-CM

## 2023-01-06 RX ORDER — BENZONATATE 200 MG/1
200 CAPSULE ORAL 3 TIMES DAILY PRN
Qty: 21 CAPSULE | Refills: 0 | Status: SHIPPED | OUTPATIENT
Start: 2023-01-06 | End: 2023-01-16

## 2023-01-06 NOTE — PROGRESS NOTES
01/09/2023  Patient feels Tessalon as helping but she still has a deep cough.  She has an appointment 01/18/2023.  This is the 2nd episode of persistent cough over the last 2 months.  She did have upper respiratory infections preceding each episode.  We may consider pulmonary function tests and or inhalers at her follow-up visit if she has residual symptoms.  She was instructed to continue her Atrovent nasal spray and her Tessalon Perles.              Patient calls with complaint cough. Left message for return phone call  Patient returned phone call at 626 pm . Requested refill Tessalon perles.   Sputum is clear to yellow and cough has been present for 5 days with no fever.  She just completed a course of Doxy in late Dec  Patient to call for persistent or worsening symptoms

## 2023-01-18 ENCOUNTER — OFFICE VISIT (OUTPATIENT)
Dept: FAMILY MEDICINE | Facility: CLINIC | Age: 68
End: 2023-01-18
Payer: MEDICARE

## 2023-01-18 VITALS
HEIGHT: 65 IN | DIASTOLIC BLOOD PRESSURE: 67 MMHG | WEIGHT: 147.81 LBS | BODY MASS INDEX: 24.63 KG/M2 | TEMPERATURE: 98 F | HEART RATE: 73 BPM | RESPIRATION RATE: 18 BRPM | SYSTOLIC BLOOD PRESSURE: 103 MMHG | OXYGEN SATURATION: 98 %

## 2023-01-18 DIAGNOSIS — F41.9 CHRONIC ANXIETY: ICD-10-CM

## 2023-01-18 DIAGNOSIS — R05.3 PERSISTENT COUGH: ICD-10-CM

## 2023-01-18 DIAGNOSIS — I10 PRIMARY HYPERTENSION: ICD-10-CM

## 2023-01-18 DIAGNOSIS — J30.9 ALLERGIC RHINITIS, UNSPECIFIED SEASONALITY, UNSPECIFIED TRIGGER: ICD-10-CM

## 2023-01-18 DIAGNOSIS — Z23 NEED FOR PROPHYLACTIC VACCINATION AGAINST STREPTOCOCCUS PNEUMONIAE (PNEUMOCOCCUS): ICD-10-CM

## 2023-01-18 DIAGNOSIS — R05.3 PERSISTENT COUGH FOR 3 WEEKS OR LONGER: Primary | ICD-10-CM

## 2023-01-18 DIAGNOSIS — L60.3 DYSTROPHIC NAIL: ICD-10-CM

## 2023-01-18 DIAGNOSIS — Z13.820 SCREENING FOR OSTEOPOROSIS: ICD-10-CM

## 2023-01-18 DIAGNOSIS — F32.A MILD DEPRESSION: ICD-10-CM

## 2023-01-18 LAB
CHOLEST SERPL-MCNC: 179 MG/DL
CHOLEST/HDLC SERPL: 3 {RATIO} (ref 0–5)
HDLC SERPL-MCNC: 65 MG/DL (ref 35–60)
LDLC SERPL CALC-MCNC: 101 MG/DL (ref 50–140)
TRIGL SERPL-MCNC: 63 MG/DL (ref 37–140)
VLDLC SERPL CALC-MCNC: 13 MG/DL

## 2023-01-18 PROCEDURE — 87102 FUNGUS ISOLATION CULTURE: CPT | Performed by: FAMILY MEDICINE

## 2023-01-18 PROCEDURE — 80061 LIPID PANEL: CPT | Performed by: FAMILY MEDICINE

## 2023-01-18 PROCEDURE — 99215 OFFICE O/P EST HI 40 MIN: CPT | Mod: PBBFAC,25 | Performed by: FAMILY MEDICINE

## 2023-01-18 PROCEDURE — 90677 PCV20 VACCINE IM: CPT | Mod: PBBFAC

## 2023-01-18 PROCEDURE — G0009 ADMIN PNEUMOCOCCAL VACCINE: HCPCS | Mod: PBBFAC

## 2023-01-18 PROCEDURE — 36415 COLL VENOUS BLD VENIPUNCTURE: CPT | Performed by: FAMILY MEDICINE

## 2023-01-18 RX ORDER — BENZONATATE 200 MG/1
200 CAPSULE ORAL 3 TIMES DAILY PRN
COMMUNITY
End: 2023-01-18 | Stop reason: SDUPTHER

## 2023-01-18 RX ORDER — BENZONATATE 200 MG/1
200 CAPSULE ORAL 3 TIMES DAILY PRN
Qty: 20 CAPSULE | Refills: 1 | Status: SHIPPED | OUTPATIENT
Start: 2023-01-18 | End: 2023-02-15

## 2023-01-18 RX ORDER — FLUTICASONE PROPIONATE 50 MCG
2 SPRAY, SUSPENSION (ML) NASAL DAILY
Qty: 15.6 ML | Refills: 11 | Status: SHIPPED | OUTPATIENT
Start: 2023-01-18 | End: 2024-02-05 | Stop reason: SDUPTHER

## 2023-01-18 RX ADMIN — PNEUMOCOCCAL 20-VALENT CONJUGATE VACCINE 0.5 ML
2.2; 2.2; 2.2; 2.2; 2.2; 2.2; 2.2; 2.2; 2.2; 2.2; 2.2; 2.2; 2.2; 2.2; 2.2; 2.2; 4.4; 2.2; 2.2; 2.2 INJECTION, SUSPENSION INTRAMUSCULAR at 10:01

## 2023-01-18 NOTE — PROGRESS NOTES
Mom advised. Scripts sent to [facility]. cmoSubjective:       Patient ID: Jennie Julio is a 67 y.o. female.    Chief Complaint: Follow-up (OA. Bilat knees/Chronic cough early am and pm. /Possible lab work/Cut toenails)    HPI   Persistent Cough:  Jennie presents today for follow-up of a persistent cough since since 12/4/2022. She has been treated with Zyrtec , Flonase , Doxy x 10 days.  Sx initially improved then recurred.  CXR 2 view was negative   COVID/FLU/RSV PCR Negative   Strep and throat culture Negative   HX OF BRONCHITIS AND PNEUMONIA IN PAST  Denies reflux /heartburn  Admits to persistent nasal congestion, sore throat  and post-nasal drip   Reports associated shortness of breath when coughing a lot   Symptoms worse at night and in am   Hx of second hand smoke exposure x >20 yrs   Discolored/thickened toenails-  Podiatrist would not trim nails because she does not have DM   Recommended Lamisil but she has not taken it yet.    Desires nails to be trimmed   Chronic anxiety and depression :  well controlled on Effexor   Palpitations/ Venous insufficiency:  Has upcoming appt with Lauryn 1/24/23   Health Maintenance;  PNEUMOCOCCAL VACCINATION TODAY  OSTEOPOROSIS SCREENING DUE   Review of Systems   Respiratory:  Positive for cough and shortness of breath.    History of palpitations/venous insufficiency:  Has an appointment January 24, 2023 with Dr. Combs     Past Medical History:   Diagnosis Date    Chronic anxiety 5/16/2022    Dystrophic nail 12/13/2022    History of pneumonia 12/13/2022    Low back pain with sciatica 5/16/2022    Mild depression 5/16/2022    Mitral valve prolapse 5/16/2022    Mixed hyperlipidemia 5/16/2022    Osteoarthritis of ankle 5/16/2022    Personal history of colonic polyps 06/10/2013    Primary hypertension 5/16/2022    Varicose veins of lower extremity 5/16/2022     Current Outpatient Medications on File Prior to Visit   Medication Sig Dispense Refill    aspirin (ECOTRIN) 81 MG EC tablet Take 1 tablet by mouth Daily.       "atorvastatin (LIPITOR) 40 MG tablet TAKE ONE TABLET BY MOUTH EVERY DAY 30 tablet 5    benazepriL (LOTENSIN) 20 MG tablet TAKE ONE TABLET BY MOUTH EVERY DAY 30 tablet 5    benzonatate (TESSALON) 200 MG capsule Take 200 mg by mouth 3 (three) times daily as needed for Cough.      coenzyme Q10 100 mg Chew Take by mouth.      fluticasone propionate (FLONASE) 50 mcg/actuation nasal spray 2 sprays by Each Nostril route Daily.      gabapentin (NEURONTIN) 100 MG capsule TAKE ONE CAPSULE BY MOUTH EVERY MORNING AND AT NOON AND TAKE THREE CAPSULES BY MOUTH AT NIGHT 150 capsule 3    mv-mn-folic-lutein-herbal 293 (ALIVE WOMEN'S 50 PLUS GUMMY) 120 mcg-150 mcg -37.5 mg Chew Take by mouth.      venlafaxine (EFFEXOR-XR) 75 MG 24 hr capsule TAKE ONE CAPSULE BY MOUTH EVERY DAY 30 capsule 5    cyclobenzaprine (FLEXERIL) 10 MG tablet Take 1 tablet by mouth 3 (three) times daily as needed.      terbinafine HCL (LAMISIL) 250 mg tablet Take 250 mg by mouth once daily.      [DISCONTINUED] doxycycline (VIBRA-TABS) 100 MG tablet Take 1 tablet (100 mg total) by mouth every 12 (twelve) hours. 20 tablet 0     Objective:      Vitals:    01/18/23 0914   BP: 103/67   Pulse: 73   Resp: 18   Temp: 98.4 °F (36.9 °C)   /67 (BP Location: Right arm, Patient Position: Sitting, BP Method: Medium (Automatic))   Pulse 73   Temp 98.4 °F (36.9 °C) (Oral)   Resp 18   Ht 5' 5" (1.651 m)   Wt 67 kg (147 lb 12.8 oz)   SpO2 98%   BMI 24.60 kg/m²       Physical Exam  Constitutional:       General: She is not in acute distress.     Appearance: She is not ill-appearing or toxic-appearing.   Cardiovascular:      Rate and Rhythm: Normal rate and regular rhythm.      Heart sounds:     No friction rub. No gallop.   Pulmonary:      Effort: Pulmonary effort is normal. No respiratory distress.      Breath sounds: No stridor. Rhonchi present. No wheezing or rales.   Chest:      Chest wall: No tenderness.   Neurological:      Mental Status: She is alert.        " Latest Reference Range & Units 01/18/23 12:06   Cholesterol <=200 mg/dL 179   HDL 35 - 60 mg/dL 65 (H)   LDL Cholesterol External 50.00 - 140.00 mg/dL 101.00   Total Cholesterol/HDL Ratio 0 - 5  3   Triglycerides 37 - 140 mg/dL 63     Assessment/Plan:  Persistent cough for 3 weeks or longer  -    Trial of ipratropium (ATROVENT HFA) 17 mcg/actuation inhaler; Inhale 2 puffs into the lungs every 6 (six) hours. Rescue  Dispense: 12.9 g; Refill: 0  -     Complete PFT w/ bronchodilator; Future; Expected date: 01/18/2023  -     benzonatate (TESSALON) 200 MG capsule; Take 1 capsule (200 mg total) by mouth 3 (three) times daily as needed for Cough.  Dispense: 20 capsule; Refill: 1    Screening for osteoporosis  -     DXA Bone Density Spine And Hip;   Need for prophylactic vaccination against Streptococcus pneumoniae (pneumococcus)  -     pneumoc 20-jennifer conj-dip cr(PF) (PREVNAR-20 (PF)) injection Syrg 0.5 mL    Dystrophic nail       - nails trimmed at patient's request        - Well tolerated/no injury or bleeding        - Fungal Culture    Allergic rhinitis, unspecified seasonality, unspecified trigger  -     fluticasone propionate (FLONASE) 50 mcg/actuation nasal spray; 2 sprays (100 mcg total) by Each Nostril route Daily.  Dispense: 15.6 mL; Refill: 11    Chronic anxiety/Mild depression  Continue Effexor   Primary hypertension  -     Lipid Panel  -     Comprehensive Metabolic Panel       Follow up in about 4 weeks (around 2/15/2023).

## 2023-01-30 ENCOUNTER — HOSPITAL ENCOUNTER (OUTPATIENT)
Dept: RADIOLOGY | Facility: HOSPITAL | Age: 68
Discharge: HOME | End: 2023-01-30
Attending: FAMILY MEDICINE
Payer: MEDICARE

## 2023-01-30 ENCOUNTER — HOSPITAL ENCOUNTER (OUTPATIENT)
Dept: PULMONOLOGY | Facility: HOSPITAL | Age: 68
Discharge: HOME | End: 2023-01-30
Attending: FAMILY MEDICINE
Payer: MEDICARE

## 2023-01-30 DIAGNOSIS — Z13.820 SCREENING FOR OSTEOPOROSIS: ICD-10-CM

## 2023-01-30 DIAGNOSIS — R05.3 PERSISTENT COUGH: ICD-10-CM

## 2023-01-30 LAB
DLCO SINGLE BREATH LLN: 16.9
DLCO SINGLE BREATH PRE REF: 60.9 %
DLCO SINGLE BREATH REF: 22.63
DLCOC SBVA LLN: 3.02
DLCOC SBVA REF: 4.43
DLCOC SINGLE BREATH LLN: 16.9
DLCOC SINGLE BREATH REF: 22.63
DLCOVA LLN: 3.02
DLCOVA PRE REF: 95.1 %
DLCOVA PRE: 4.22 ML/(MIN*MMHG*L) (ref 3.02–5.85)
DLCOVA REF: 4.43
ERV LLN: -16449.27
ERV PRE REF: 61.2 %
ERV REF: 0.73
FEF 25 75 CHG: 5.7 %
FEF 25 75 LLN: 1.02
FEF 25 75 POST REF: 92.2 %
FEF 25 75 PRE REF: 87.2 %
FEF 25 75 REF: 2.1
FET100 CHG: 6.4 %
FEV1 CHG: 10.1 %
FEV1 FVC CHG: 2.1 %
FEV1 FVC LLN: 66
FEV1 FVC POST REF: 101.4 %
FEV1 FVC PRE REF: 99.3 %
FEV1 FVC REF: 79
FEV1 LLN: 1.8
FEV1 POST REF: 77.3 %
FEV1 PRE REF: 70.2 %
FEV1 REF: 2.43
FRCPLETH LLN: 1.94
FRCPLETH PREREF: 79.7 %
FRCPLETH REF: 2.76
FVC CHG: 7.8 %
FVC LLN: 2.32
FVC POST REF: 75.7 %
FVC PRE REF: 70.2 %
FVC REF: 3.11
IVC PRE: 2.23 L (ref 2.32–3.95)
IVC SINGLE BREATH LLN: 2.32
IVC SINGLE BREATH PRE REF: 71.7 %
IVC SINGLE BREATH REF: 3.11
MVV LLN: 78
MVV PRE REF: 68 %
MVV REF: 92
PEF CHG: 24.8 %
PEF LLN: 4.39
PEF POST REF: 62 %
PEF PRE REF: 49.7 %
PEF REF: 6.15
POST FEF 25 75: 1.94 L/S (ref 1.02–3.59)
POST FET 100: 6.54 SEC
POST FEV1 FVC: 79.67 % (ref 65.98–89.4)
POST FEV1: 1.88 L (ref 1.8–3.03)
POST FVC: 2.36 L (ref 2.32–3.95)
POST PEF: 3.81 L/S (ref 4.39–7.92)
PRE DLCO: 13.79 ML/(MIN*MMHG) (ref 16.9–28.37)
PRE ERV: 0.45 L (ref -16449.27–16450.73)
PRE FEF 25 75: 1.83 L/S (ref 1.02–3.59)
PRE FET 100: 6.15 SEC
PRE FEV1 FVC: 78.03 % (ref 65.98–89.4)
PRE FEV1: 1.7 L (ref 1.8–3.03)
PRE FRC PL: 2.2 L (ref 1.94–3.59)
PRE FVC: 2.19 L (ref 2.32–3.95)
PRE MVV: 62.49 L/MIN (ref 78.08–105.64)
PRE PEF: 3.06 L/S (ref 4.39–7.92)
PRE RV: 1.75 L (ref 1.45–2.6)
PRE TLC: 4.07 L (ref 4.12–6.09)
RAW LLN: 3.06
RAW PRE REF: 78.2 %
RAW PRE: 2.39 CMH2O*S/L (ref 3.06–3.06)
RAW REF: 3.06
RV LLN: 1.45
RV PRE REF: 86.3 %
RV REF: 2.03
RVTLC LLN: 31
RVTLC PRE REF: 104.8 %
RVTLC PRE: 43.04 % (ref 31.47–50.65)
RVTLC REF: 41
TLC LLN: 4.12
TLC PRE REF: 79.7 %
TLC REF: 5.11
VA PRE: 3.27 L (ref 4.96–4.96)
VA SINGLE BREATH LLN: 4.96
VA SINGLE BREATH PRE REF: 66 %
VA SINGLE BREATH REF: 4.96
VC LLN: 2.32
VC PRE REF: 74.4 %
VC PRE: 2.32 L (ref 2.32–3.95)
VC REF: 3.11

## 2023-01-30 PROCEDURE — 94729 DIFFUSING CAPACITY: CPT

## 2023-01-30 PROCEDURE — 94640 AIRWAY INHALATION TREATMENT: CPT

## 2023-01-30 PROCEDURE — 94060 EVALUATION OF WHEEZING: CPT

## 2023-01-30 PROCEDURE — 77080 DXA BONE DENSITY AXIAL: CPT | Mod: TC

## 2023-01-30 PROCEDURE — 94726 PLETHYSMOGRAPHY LUNG VOLUMES: CPT

## 2023-01-30 RX ORDER — ALBUTEROL SULFATE 0.83 MG/ML
2.5 SOLUTION RESPIRATORY (INHALATION) EVERY 4 HOURS PRN
Status: DISCONTINUED | OUTPATIENT
Start: 2023-01-30 | End: 2023-05-08

## 2023-01-30 RX ORDER — IPRATROPIUM BROMIDE 0.5 MG/2.5ML
0.5 SOLUTION RESPIRATORY (INHALATION) ONCE
Status: COMPLETED | OUTPATIENT
Start: 2023-01-30 | End: 2023-01-30

## 2023-01-30 RX ADMIN — ALBUTEROL SULFATE 2.5 MG: 0.83 SOLUTION RESPIRATORY (INHALATION) at 08:01

## 2023-01-30 RX ADMIN — IPRATROPIUM BROMIDE 0.5 MG: 0.5 SOLUTION RESPIRATORY (INHALATION) at 08:01

## 2023-02-14 ENCOUNTER — TELEPHONE (OUTPATIENT)
Dept: FAMILY MEDICINE | Facility: CLINIC | Age: 68
End: 2023-02-14
Payer: MEDICARE

## 2023-02-15 ENCOUNTER — OFFICE VISIT (OUTPATIENT)
Dept: FAMILY MEDICINE | Facility: CLINIC | Age: 68
End: 2023-02-15
Payer: MEDICARE

## 2023-02-15 VITALS
OXYGEN SATURATION: 99 % | SYSTOLIC BLOOD PRESSURE: 115 MMHG | HEIGHT: 65 IN | HEART RATE: 81 BPM | DIASTOLIC BLOOD PRESSURE: 74 MMHG | WEIGHT: 147 LBS | TEMPERATURE: 98 F | BODY MASS INDEX: 24.49 KG/M2 | RESPIRATION RATE: 18 BRPM

## 2023-02-15 DIAGNOSIS — F41.9 CHRONIC ANXIETY: ICD-10-CM

## 2023-02-15 DIAGNOSIS — L60.3 DYSTROPHIC NAIL: ICD-10-CM

## 2023-02-15 DIAGNOSIS — I10 PRIMARY HYPERTENSION: ICD-10-CM

## 2023-02-15 DIAGNOSIS — B35.1 ONYCHOMYCOSIS: ICD-10-CM

## 2023-02-15 DIAGNOSIS — R05.3 PERSISTENT COUGH: Primary | ICD-10-CM

## 2023-02-15 PROCEDURE — 99215 OFFICE O/P EST HI 40 MIN: CPT | Mod: PBBFAC | Performed by: FAMILY MEDICINE

## 2023-02-20 LAB
FUNGUS SPEC CULT: ABNORMAL
FUNGUS SPEC CULT: ABNORMAL

## 2023-04-05 ENCOUNTER — OFFICE VISIT (OUTPATIENT)
Dept: FAMILY MEDICINE | Facility: CLINIC | Age: 68
End: 2023-04-05
Payer: MEDICARE

## 2023-04-05 VITALS
HEART RATE: 71 BPM | WEIGHT: 144.38 LBS | DIASTOLIC BLOOD PRESSURE: 72 MMHG | BODY MASS INDEX: 24.06 KG/M2 | OXYGEN SATURATION: 97 % | SYSTOLIC BLOOD PRESSURE: 107 MMHG | RESPIRATION RATE: 18 BRPM | TEMPERATURE: 99 F | HEIGHT: 65 IN

## 2023-04-05 DIAGNOSIS — I10 PRIMARY HYPERTENSION: ICD-10-CM

## 2023-04-05 DIAGNOSIS — L40.9 PSORIASIS: ICD-10-CM

## 2023-04-05 DIAGNOSIS — M85.88 OSTEOPENIA OF LUMBAR SPINE: Primary | ICD-10-CM

## 2023-04-05 DIAGNOSIS — B35.1 ONYCHOMYCOSIS: ICD-10-CM

## 2023-04-05 DIAGNOSIS — S61.312D LACERATION OF RIGHT MIDDLE FINGER WITHOUT FOREIGN BODY WITH DAMAGE TO NAIL, SUBSEQUENT ENCOUNTER: ICD-10-CM

## 2023-04-05 PROCEDURE — 99215 OFFICE O/P EST HI 40 MIN: CPT | Mod: PBBFAC | Performed by: FAMILY MEDICINE

## 2023-04-05 NOTE — PROGRESS NOTES
Subjective:       Patient ID: Jennie Julio is a 67 y.o. female.    Chief Complaint: Osteopenia and Right middle finger laceration    HPI  Jennie is an established patient who presents today for follow-up of recent persistent cough, onychomycosis, osteopenia, and HTN with intermittently elevated BP.  Osteopenia   BMD  1/18/2023 reviewed in detail today with patient   Laceration Right middle finger 4/1/2023 -cut with scissors  Reported persistent bleeding -closed with tissue adhesive (glue)  Concerned not closing well   Tdap UTD 9/22/2021  Sent to ED 4/3/2023 due to persistent bleeding   Persistent cough:  Nonsmoker  Chest x-ray 12/13/2022 without acute cardiopulmonary process  Pulmonary function test 01/30/2023  -No obstruction  -Mild restriction  -No response to bronchodilator  -No diffusion defect after correction  Patient has been using Atrovent inhaler since last visit with benefit  Cough began approximately 14 weeks ago and has significantly improved/resolved  Chronic situational anxiety and depression  Well controlled on Effexor  Has intermittent history in the past and worsen during COVID due to death of  due to COVID-19  Denies any SI/HI  Was a bit upset on Valentine's Day due to relationship issue but this is resolved   Onychomycosis and tinea pedis  Markedly improved with oral Lamisil x 60 days  Wishes to continue  Past Medical History:   Diagnosis Date    Chronic anxiety 5/16/2022    Dystrophic nail 12/13/2022    History of pneumonia 12/13/2022    Low back pain with sciatica 5/16/2022    Mild depression 5/16/2022    Mitral valve prolapse 5/16/2022    Mixed hyperlipidemia 5/16/2022    Osteoarthritis of ankle 5/16/2022    Persistent cough 1/30/2023    Personal history of colonic polyps 06/10/2013    Primary hypertension 5/16/2022    Varicose veins of lower extremity 5/16/2022     Current Outpatient Medications on File Prior to Visit   Medication Sig Dispense Refill    aspirin (ECOTRIN) 81 MG EC tablet Take  "1 tablet by mouth Daily.      atorvastatin (LIPITOR) 40 MG tablet TAKE ONE TABLET BY MOUTH EVERY DAY 30 tablet 5    benazepriL (LOTENSIN) 20 MG tablet TAKE ONE TABLET BY MOUTH EVERY DAY 30 tablet 5    coenzyme Q10 100 mg Chew Take by mouth.      fluticasone propionate (FLONASE) 50 mcg/actuation nasal spray 2 sprays (100 mcg total) by Each Nostril route Daily. 15.6 mL 11    gabapentin (NEURONTIN) 100 MG capsule TAKE ONE CAPSULE BY MOUTH EVERY MORNING AND AT NOON AND TAKE THREE CAPSULES BY MOUTH AT NIGHT 150 capsule 3    mv-mn-folic-lutein-herbal 293 (ALIVE WOMEN'S 50 PLUS GUMMY) 120 mcg-150 mcg -37.5 mg Chew Take by mouth.      terbinafine HCL (LAMISIL) 250 mg tablet Take 250 mg by mouth once daily.      venlafaxine (EFFEXOR-XR) 75 MG 24 hr capsule TAKE ONE CAPSULE BY MOUTH EVERY DAY 30 capsule 5    cyclobenzaprine (FLEXERIL) 10 MG tablet Take 1 tablet by mouth 3 (three) times daily as needed.      EASIVENT HOLDING CHAMBER inhaler USE AS DIRECTED WITH SHIRLEY (Patient not taking: Reported on 2/15/2023) 1 each 0         Review of Systems         Objective:      Vitals:    04/05/23 1036   BP: 107/72   Pulse: 71   Resp: 18   Temp: 98.6 °F (37 °C)   /72 (BP Location: Right arm, Patient Position: Sitting, BP Method: Medium (Automatic))   Pulse 71   Temp 98.6 °F (37 °C) (Oral)   Resp 18   Ht 5' 5" (1.651 m)   Wt 65.5 kg (144 lb 6.4 oz)   SpO2 97%   BMI 24.03 kg/m²       Physical Exam  Constitutional:       General: She is not in acute distress.     Appearance: Normal appearance. She is not ill-appearing, toxic-appearing or diaphoretic.   Cardiovascular:      Rate and Rhythm: Normal rate and regular rhythm.   Pulmonary:      Effort: Pulmonary effort is normal. No respiratory distress.      Breath sounds: No stridor. No wheezing, rhonchi or rales.   Skin:     Comments: Finger - laceration without gaping and no signs of erythema, warmth or drainage    Neurological:      Mental Status: She is alert and oriented to " person, place, and time.      Gait: Gait normal.   Psychiatric:         Mood and Affect: Mood normal.         Behavior: Behavior normal. Behavior is cooperative.         Thought Content: Thought content normal. Thought content is not paranoid or delusional. Thought content does not include homicidal or suicidal ideation.         Judgment: Judgment normal.       EXAMINATION:  DEXA BONE DENSITY SPINE HIP     CLINICAL HISTORY:  Encounter for screening for osteoporosisscreening;     COMPARISON:  No reference studies are available for comparison.     FINDINGS:  BMD     T-score     0.960 -1.9.  Lumbar Spine (L1-L4)     0.932 -0.6.  Total Left Femur     0.943 -0.5.  Total Right Femur     0.937 -0.6.  Mean Femur     Impression:     Osteopenia based on the lumbar spine.  Moderately increased fracture risk.        Electronically signed by: Rolf Moeller  Date:                                            01/30/2023  Time:                                           15:08  Assessment/Plan:  Osteopenia of lumbar spine  Calculated FRAX Score based on reported BMD hip   Major osteoporotic 7.5%  Hip Fracture 0.5%  WBE + Vitamin D and adequate dietary calcium   Primary hypertension  BP at goal  Continue to monitor closely at home  Continue current medications  Psoriasis  Currently without symptoms   Laceration of right middle finger without foreign body with damage to nail, subsequent encounter  Appears well approximated -healing ; no additional intervention  Onychomycosis  Continue Lamisil for total 90 days       Follow up in about 2 months (around 6/5/2023).

## 2023-05-03 ENCOUNTER — TELEPHONE (OUTPATIENT)
Dept: FAMILY MEDICINE | Facility: CLINIC | Age: 68
End: 2023-05-03
Payer: MEDICARE

## 2023-05-03 ENCOUNTER — OFFICE VISIT (OUTPATIENT)
Dept: FAMILY MEDICINE | Facility: CLINIC | Age: 68
End: 2023-05-03
Payer: MEDICARE

## 2023-05-03 VITALS
OXYGEN SATURATION: 97 % | HEIGHT: 65 IN | RESPIRATION RATE: 18 BRPM | WEIGHT: 145.38 LBS | SYSTOLIC BLOOD PRESSURE: 106 MMHG | HEART RATE: 76 BPM | BODY MASS INDEX: 24.22 KG/M2 | TEMPERATURE: 99 F | DIASTOLIC BLOOD PRESSURE: 69 MMHG

## 2023-05-03 DIAGNOSIS — J02.9 PHARYNGITIS, UNSPECIFIED ETIOLOGY: ICD-10-CM

## 2023-05-03 DIAGNOSIS — J01.00 ACUTE NON-RECURRENT MAXILLARY SINUSITIS: Primary | ICD-10-CM

## 2023-05-03 DIAGNOSIS — J01.00 ACUTE NON-RECURRENT MAXILLARY SINUSITIS: ICD-10-CM

## 2023-05-03 DIAGNOSIS — J06.9 UPPER RESPIRATORY TRACT INFECTION, UNSPECIFIED TYPE: Primary | ICD-10-CM

## 2023-05-03 PROCEDURE — 0241U COVID/RSV/FLU A&B PCR: CPT | Performed by: FAMILY MEDICINE

## 2023-05-03 PROCEDURE — 99215 OFFICE O/P EST HI 40 MIN: CPT | Mod: PBBFAC | Performed by: FAMILY MEDICINE

## 2023-05-03 PROCEDURE — 87651 STREP A DNA AMP PROBE: CPT | Performed by: FAMILY MEDICINE

## 2023-05-03 RX ORDER — AMOXICILLIN AND CLAVULANATE POTASSIUM 875; 125 MG/1; MG/1
1 TABLET, FILM COATED ORAL EVERY 12 HOURS
Qty: 20 TABLET | Refills: 0 | Status: SHIPPED | OUTPATIENT
Start: 2023-05-03 | End: 2023-06-07 | Stop reason: ALTCHOICE

## 2023-05-03 NOTE — PROGRESS NOTES
"Subjective:       Patient ID: Jennie Julio is a 67 y.o. female.    Chief Complaint: Sinus Problem and Sore Throat (Since the weekend. No fever, diarrhea, body ache, loss of taste or smell, SOB)    HPI  Ms. Schneider presents today with complaint of severe sore throat which began Saturday, nasal congestion with yellow-green nasal discharge, runny nose, sinus pressure, and cough productive of yellow-green sputum.  She denies fever, chills, nausea/vomiting/diarrhea.  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  She is using her Atrovent nasal spray with modest benefit.  She has not resumed her Atrovent HFA inhaler because the cough is not bothersome.  She does also note some wheezing.  Review of Systems   Constitutional:  Negative for chills and fever.   HENT:  Positive for nasal congestion, rhinorrhea, sinus pressure/congestion and sore throat.    Respiratory:  Positive for cough and wheezing. Negative for shortness of breath.    Cardiovascular:  Negative for chest pain.          Objective:      Vitals:    05/03/23 1059   BP: 106/69   Pulse: 76   Resp: 18   Temp: 98.6 °F (37 °C)   /69 (BP Location: Right arm, Patient Position: Sitting, BP Method: Medium (Automatic))   Pulse 76   Temp 98.6 °F (37 °C) (Oral)   Resp 18   Ht 5' 5" (1.651 m)   Wt 66 kg (145 lb 6.4 oz)   SpO2 97%   BMI 24.20 kg/m²       Physical Exam  Constitutional:       General: She is not in acute distress.     Appearance: She is not ill-appearing, toxic-appearing or diaphoretic.   HENT:      Right Ear: Tympanic membrane normal.      Left Ear: Tympanic membrane normal.      Nose: Congestion and rhinorrhea present.      Mouth/Throat:      Pharynx: Posterior oropharyngeal erythema present. No oropharyngeal exudate.   Cardiovascular:      Rate and Rhythm: Normal rate and regular rhythm.   Pulmonary:      Effort: Pulmonary effort is normal. No respiratory distress.     "  Breath sounds: No wheezing, rhonchi or rales.   Neurological:      Mental Status: She is alert and oriented to person, place, and time.   Psychiatric:         Mood and Affect: Mood normal.         Behavior: Behavior normal.         Assessment/Plan:  Upper respiratory tract infection, unspecified type  -     COVID/RSV/FLU A&B PCR; Future; Expected date: 05/03/2023  Pharyngitis, unspecified etiology  Acute non-recurrent maxillary sinusitis       -     Strep Group A by PCR; Future; Expected date: 05/03/2023  Consider antibiotic therapy if indicated based on symptoms and testing above.    Follow up in about 5 weeks (around 6/7/2023).

## 2023-05-04 DIAGNOSIS — J02.9 PHARYNGITIS, UNSPECIFIED ETIOLOGY: ICD-10-CM

## 2023-05-04 DIAGNOSIS — R05.3 PERSISTENT COUGH FOR 3 WEEKS OR LONGER: ICD-10-CM

## 2023-05-04 RX ORDER — IPRATROPIUM BROMIDE 17 UG/1
AEROSOL, METERED RESPIRATORY (INHALATION)
Qty: 12.9 G | Refills: 0 | Status: SHIPPED | OUTPATIENT
Start: 2023-05-04 | End: 2023-05-23

## 2023-05-04 NOTE — TELEPHONE ENCOUNTER
Results discussed with patient. Patient has been ill for 7 days without improvement. Will provide prescription for Augmentin for probable developing sinusitis to be taken only if symptoms persist through Sunday given patients history of sinusitis in 12/22.  PFT without evidence of obstruction 1/2023  Influenza A PCR Not Detected Not Detected    Influenza B PCR Not Detected Not Detected    Respiratory Syncytial Virus PCR Not Detected Not Detected    SARS-CoV-2 PCR Not Detected, Negative, Invalid Not Detected      STREP A PCR ([facility]) Not Detected Not Detected

## 2023-05-06 LAB — BACTERIA THROAT CULT: NORMAL

## 2023-05-08 ENCOUNTER — OFFICE VISIT (OUTPATIENT)
Dept: FAMILY MEDICINE | Facility: CLINIC | Age: 68
End: 2023-05-08
Payer: MEDICARE

## 2023-05-08 VITALS
BODY MASS INDEX: 24.1 KG/M2 | HEART RATE: 69 BPM | TEMPERATURE: 98 F | DIASTOLIC BLOOD PRESSURE: 79 MMHG | OXYGEN SATURATION: 99 % | SYSTOLIC BLOOD PRESSURE: 124 MMHG | RESPIRATION RATE: 18 BRPM | WEIGHT: 144.63 LBS | HEIGHT: 65 IN

## 2023-05-08 DIAGNOSIS — B96.89 BACTERIAL SINUSITIS: Primary | ICD-10-CM

## 2023-05-08 DIAGNOSIS — J32.9 BACTERIAL SINUSITIS: Primary | ICD-10-CM

## 2023-05-08 PROCEDURE — 99214 OFFICE O/P EST MOD 30 MIN: CPT | Mod: PBBFAC

## 2023-05-08 NOTE — LETTER
May 8, 2023    Jennie Julio  [address] [address]         [facility]  [address] [address]  Phone: [phone] May 8, 2023     Patient: Jennie Julio   YOB: 1955   Date of Visit: 5/8/2023       To Whom It May Concern:    It is my medical opinion that Jennie Julio  should be excused for the absences the week of 5/1/2023 and will return to work on 5/9/2023 without restrictions .    If you have any questions or concerns, please don't hesitate to call.    Sincerely,        Mary Rosales MD

## 2023-05-08 NOTE — PROGRESS NOTES
Chief Complaint  URI (For about 10-12 days )      History of Present Illness  Jennie Julio is a 67 y.o. female presents to the clinic for follow up sinusitis    1 week of sorethroat, sinus pain nasal congestion, productive cough with yellow/green sputum, left ear pain, fatigue    PCP initiated Augmentin x 10days, started taking 5/7/2023  Much better today  No fevers, sorethroat improving, sputum changing to pale pale now, no nausea/vomiting, diarrhea  Tolerating po  Missed work for a week, requesting excuse      ROS per HPI      Physical Exam    Vitals:    05/08/23 0933   BP: 124/79   Pulse: 69   Resp: 18   Temp: 98.2 °F (36.8 °C)     Wt Readings from Last 2 Encounters:   05/08/23 65.6 kg (144 lb 9.6 oz)   05/03/23 66 kg (145 lb 6.4 oz)     Gen: NAD  HEENT: NC/AT. Cervical ROM intact without limitation, PERRLA, EOMI, Nasal turbinates not inflamed, no drainage. MMM, no oropharyngeal erythema/exudates. TM clear without evidence of effusion, erythema, or bulging, +light reflex, right non obstructive cerumen. +submandibular and anterior cervical lymphadenopathy  CV:RRR, no murmurs, no carotid/renal bruits  Pulm: CTABL, non labored breathing  MSK: no gait abnormalities      Current Outpatient Medications  Current Outpatient Medications   Medication Instructions    amoxicillin-clavulanate 875-125mg (AUGMENTIN) 875-125 mg per tablet 1 tablet, Oral, Every 12 hours    aspirin (ECOTRIN) 81 MG EC tablet 1 tablet, Oral, Daily    atorvastatin (LIPITOR) 40 MG tablet TAKE ONE TABLET BY MOUTH EVERY DAY    ATROVENT HFA 17 mcg/actuation inhaler INHALE 2 PUFFS BY MOUTH INTO THE LUNGS EVERY 6 HOURS    benazepriL (LOTENSIN) 20 MG tablet TAKE ONE TABLET BY MOUTH EVERY DAY    coenzyme Q10 100 mg Chew Oral    cyclobenzaprine (FLEXERIL) 10 MG tablet 1 tablet, Oral, 3 times daily PRN    EASIVENT HOLDING CHAMBER inhaler USE AS DIRECTED WITH INHAER    fluticasone propionate (FLONASE) 100 mcg, Each Nostril, Daily    gabapentin (NEURONTIN) 100 MG  capsule TAKE ONE CAPSULE BY MOUTH EVERY MORNING AND AT NOON AND TAKE THREE CAPSULES BY MOUTH AT NIGHT    mv-mn-folic-lutein-herbal 293 (ALIVE WOMEN'S 50 PLUS GUMMY) 120 mcg-150 mcg -37.5 mg Chew Oral    terbinafine HCL (LAMISIL) 250 mg, Oral, Daily    venlafaxine (EFFEXOR-XR) 75 MG 24 hr capsule TAKE ONE CAPSULE BY MOUTH EVERY DAY             Assessment / Plan:    Bacterial sinusitis   Improving  Complete Augmentin as prescribed  Work excuse printed and handed to patient  Keep follow-up with PCP  ED/return precautions provided        Follow up:    In PRN, or earlier if needed.     Mary Rosales MD  [employer] [facility] PGY-2

## 2023-05-16 RX ORDER — GABAPENTIN 100 MG/1
CAPSULE ORAL
Qty: 150 CAPSULE | Refills: 3 | Status: SHIPPED | OUTPATIENT
Start: 2023-05-16 | End: 2023-09-11 | Stop reason: SDUPTHER

## 2023-05-16 RX ORDER — VENLAFAXINE HYDROCHLORIDE 75 MG/1
CAPSULE, EXTENDED RELEASE ORAL
Qty: 30 CAPSULE | Refills: 5 | Status: SHIPPED | OUTPATIENT
Start: 2023-05-16 | End: 2023-10-25 | Stop reason: SDUPTHER

## 2023-05-23 DIAGNOSIS — R05.3 PERSISTENT COUGH FOR 3 WEEKS OR LONGER: ICD-10-CM

## 2023-05-23 RX ORDER — IPRATROPIUM BROMIDE 17 UG/1
AEROSOL, METERED RESPIRATORY (INHALATION)
Qty: 12.9 G | Refills: 0 | Status: SHIPPED | OUTPATIENT
Start: 2023-05-23 | End: 2023-06-15

## 2023-05-26 RX ORDER — BENAZEPRIL HYDROCHLORIDE 20 MG/1
TABLET ORAL
Qty: 90 TABLET | Refills: 3 | Status: SHIPPED | OUTPATIENT
Start: 2023-05-26 | End: 2023-06-07 | Stop reason: SDUPTHER

## 2023-05-26 RX ORDER — CLOBETASOL PROPIONATE 0.5 MG/G
1 OINTMENT TOPICAL 2 TIMES DAILY
COMMUNITY
Start: 2023-05-23 | End: 2023-06-05

## 2023-06-05 RX ORDER — CLOBETASOL PROPIONATE 0.5 MG/G
OINTMENT TOPICAL
Qty: 30 G | Refills: 1 | Status: SHIPPED | OUTPATIENT
Start: 2023-06-05 | End: 2023-07-11

## 2023-06-07 ENCOUNTER — OFFICE VISIT (OUTPATIENT)
Dept: FAMILY MEDICINE | Facility: CLINIC | Age: 68
End: 2023-06-07
Payer: MEDICARE

## 2023-06-07 ENCOUNTER — HOSPITAL ENCOUNTER (OUTPATIENT)
Dept: RADIOLOGY | Facility: HOSPITAL | Age: 68
Discharge: HOME | End: 2023-06-07
Attending: FAMILY MEDICINE
Payer: MEDICARE

## 2023-06-07 VITALS
BODY MASS INDEX: 24.29 KG/M2 | RESPIRATION RATE: 18 BRPM | SYSTOLIC BLOOD PRESSURE: 110 MMHG | HEART RATE: 67 BPM | HEIGHT: 65 IN | TEMPERATURE: 99 F | WEIGHT: 145.81 LBS | DIASTOLIC BLOOD PRESSURE: 72 MMHG | OXYGEN SATURATION: 100 %

## 2023-06-07 DIAGNOSIS — J01.00 ACUTE NON-RECURRENT MAXILLARY SINUSITIS: ICD-10-CM

## 2023-06-07 DIAGNOSIS — R35.0 URINARY FREQUENCY: ICD-10-CM

## 2023-06-07 DIAGNOSIS — I10 PRIMARY HYPERTENSION: ICD-10-CM

## 2023-06-07 DIAGNOSIS — M17.12 OSTEOARTHRITIS OF LEFT KNEE, UNSPECIFIED OSTEOARTHRITIS TYPE: ICD-10-CM

## 2023-06-07 DIAGNOSIS — R30.0 DYSURIA: Primary | ICD-10-CM

## 2023-06-07 DIAGNOSIS — M79.605 LEFT LEG PAIN: ICD-10-CM

## 2023-06-07 DIAGNOSIS — M25.562 POSTERIOR LEFT KNEE PAIN: ICD-10-CM

## 2023-06-07 DIAGNOSIS — E78.2 MIXED HYPERLIPIDEMIA: ICD-10-CM

## 2023-06-07 PROCEDURE — 87186 SC STD MICRODIL/AGAR DIL: CPT | Performed by: FAMILY MEDICINE

## 2023-06-07 PROCEDURE — 93971 EXTREMITY STUDY: CPT | Mod: LT

## 2023-06-07 PROCEDURE — 99215 OFFICE O/P EST HI 40 MIN: CPT | Mod: PBBFAC,25 | Performed by: FAMILY MEDICINE

## 2023-06-07 PROCEDURE — 87088 URINE BACTERIA CULTURE: CPT | Performed by: FAMILY MEDICINE

## 2023-06-07 RX ORDER — ATORVASTATIN CALCIUM 40 MG/1
40 TABLET, FILM COATED ORAL DAILY
Qty: 90 TABLET | Refills: 3 | Status: SHIPPED | OUTPATIENT
Start: 2023-06-07 | End: 2024-02-05 | Stop reason: SDUPTHER

## 2023-06-07 RX ORDER — BENAZEPRIL HYDROCHLORIDE 20 MG/1
20 TABLET ORAL DAILY
Qty: 90 TABLET | Refills: 3 | Status: SHIPPED | OUTPATIENT
Start: 2023-06-07 | End: 2024-02-05 | Stop reason: SDUPTHER

## 2023-06-07 NOTE — PROGRESS NOTES
Subjective:       Patient ID: Jennie Julio is a 67 y.o. female.    Chief Complaint: Follow-up, Osteopenia of lumbar spine, and Possible UTI    HPI  Jennie is a 67-year-old established patient who presents today for follow-up of her chronic medical conditions.  Patient presents today with complaint of new urinary symptoms - dysuria , frequency times several days.  Denies hematuria, fever, chills.  She also reports today new posterior knee pain times more than a week.  She denies shortness of breath, chest pain.        Past Medical History:   Diagnosis    Chronic anxiety    Dystrophic nail    History of pneumonia    Low back pain with sciatica    Mild depression    Mitral valve prolapse    Mixed hyperlipidemia-needs refill of atorvastatin; compliant with diet and exercise    Osteoarthritis of ankle    Persistent cough-appears resolved at this time    Personal history of colonic polyps    Primary hypertension-needs refill benazepril 20 mg; BP at goal today; compliant with diet and exercise    Varicose veins of lower extremity     Current Outpatient Medications on File Prior to Visit   Medication Sig Dispense Refill    aspirin (ECOTRIN) 81 MG EC tablet Take 1 tablet by mouth Daily.      ATROVENT HFA 17 mcg/actuation inhaler INHALE TWO PUFFS BY MOUTH EVERY 6 HOURS 12.9 g 0    clobetasol 0.05% (TEMOVATE) 0.05 % Oint APPLY TO AFFECTED AREA(S) TWO TIMES A DAY 30 g 1    coenzyme Q10 100 mg Chew Take by mouth.      cyclobenzaprine (FLEXERIL) 10 MG tablet Take 1 tablet by mouth 3 (three) times daily as needed.      fluticasone propionate (FLONASE) 50 mcg/actuation nasal spray 2 sprays (100 mcg total) by Each Nostril route Daily. 15.6 mL 11    gabapentin (NEURONTIN) 100 MG capsule TAKE ONE CAPSULE BY MOUTH EVERY MORNING AND NOON AND TAKE THREE CAPSULES BY MOUTH AT NIGHT 150 capsule 3    mv-mn-folic-lutein-herbal 293 (ALIVE WOMEN'S 50 PLUS GUMMY) 120 mcg-150 mcg -37.5 mg Chew Take by mouth.      terbinafine HCL (LAMISIL) 250 mg tablet  "Take 250 mg by mouth once daily.      venlafaxine (EFFEXOR-XR) 75 MG 24 hr capsule TAKE ONE CAPSULE BY MOUTH EVERY DAY 30 capsule 5         Review of Systems         Objective:      Vitals:    06/07/23 0922   BP: 110/72   Pulse: 67   Resp: 18   Temp: 98.6 °F (37 °C)   /72 (BP Location: Right arm, Patient Position: Sitting, BP Method: Medium (Automatic))   Pulse 67   Temp 98.6 °F (37 °C) (Oral)   Resp 18   Ht 5' 5" (1.651 m)   Wt 66.1 kg (145 lb 12.8 oz)   SpO2 100%   BMI 24.26 kg/m²       Physical Exam  Constitutional:       General: She is not in acute distress.     Appearance: She is normal weight. She is not ill-appearing, toxic-appearing or diaphoretic.   Cardiovascular:      Rate and Rhythm: Normal rate and regular rhythm.   Musculoskeletal:         General: Tenderness (Left Popliteal fossa; negative Homans; no calf size discrepancy) and signs of injury (History of remote injury to left knee (greater than 30 years ago)) present. No swelling.      Right lower leg: No edema.      Left lower leg: No edema.   Neurological:      Mental Status: She is alert and oriented to person, place, and time.   Psychiatric:         Mood and Affect: Mood normal.         Behavior: Behavior normal.         Thought Content: Thought content normal.         Judgment: Judgment normal.        Latest Reference Range & Units Most Recent   Color, UA Yellow, Colorless, Other, Clear  Yellow  5/26/22 11:09   Appearance, UA Clear  Clear  5/26/22 11:09   Specific Gravity,UA  1.013  5/26/22 11:09   pH, UA 5.0, 5.5, 6.0, 6.5, 7.0, 7.5, 8.0, 8.5  7.0  5/26/22 11:09   Protein, UA Negative, 300  mg/dL Trace !  5/26/22 11:09   Glucose, UA Negative, Normal mg/dL Normal  5/26/22 11:09   Ketones, UA Negative, +1, +2, +3, +4, +5, >=160 mg/dL Negative  5/26/22 11:09   Occult Blood UA Negative unit/L 2+ !  5/26/22 11:09   NITRITE UA Negative  2+ !  5/26/22 11:09   UROBILINOGEN UA >NORMAL  Normal  5/12/21 10:46   Bilirubin (UA) >Negative  " Negative  5/12/21 10:46   Bilirubin, UA Negative mg/dL Negative  5/26/22 11:09   Urobilinogen, UA 0.2, 1.0, Normal mg/dL Normal  5/26/22 11:09   Leukocytes, UA Negative, 75  unit/L 500 !  5/26/22 11:09   RBC, UA None Seen, 0-2, 3-5, 0-5 /HPF 0-5  5/26/22 11:09   WBC, UA None Seen, 0-2, 3-5, 0-5 /HPF 6-10 !  5/26/22 11:09   Bacteria, UA None Seen /HPF Trace !  5/26/22 11:09   Squamous Epithelial Cells, UA None Seen /HPF None Seen  5/26/22 11:09   Hyaline Casts, UA None Seen /lpf None Seen  5/26/22 11:09   Unclassified Crystal, UA None Seen /HPF Occ !  5/26/22 11:09     Urine Culture, Routine >/= 100,000 colonies/ml Escherichia coli Abnormal    Sensitive to cefuroxime, ceftriaxone           CV VENOUS US LLE   Left Lower Venous The left common femoral vein is normal.     The left greater saphenous vein is normal.     The left superficial femoral proximal vein is normal.     The left superficial femoral middle vein is normal.     The left superficial femoral distal vein is normal.     The left popliteal vein is normal.     The left saphenofemoral junction is normal.     The contralateral (right) common femoral vein is patent.   There is no evidence of a left lower extremity DVT.   IMP   The contralateral (right) common femoral vein is patent.  There is no evidence of a left lower extremity DVT.     There was no deep vein thrombosis identified in the visualized veins of the left leg.   Assessment/Plan:  Dysuria  Urinary frequency  -     Urinalysis-2+ nitrite, 6-10 WBCs, trace bacteria  -     Urine Culture High Risk-greater than 100,000 colonies E coli sensitive to cefuroxime  -     empiric treatment with cephalexin while awaiting final culture -continued cephalexin after sensitivities reviewed  cephALEXin (KEFLEX) 500 MG capsule; Take 1 capsule (500 mg total) by mouth every 8 (eight) hours.  Dispense: 21 capsule; Refill: 0  Left leg pain  Posterior left knee pain  -     CV Ultrasound doppler venous DVT leg left -negative  for DVT; results reviewed with patient on day of visit  Osteoarthritis of left knee, history of remote meniscus injury  Strengthening exercises provided; if home exercise program ineffective consider referral for physical therapy  Acute non-recurrent maxillary sinusitis  Completed Augmentin 5/13/2023  Primary hypertension-at goal 110/72  -     refill benazepriL (LOTENSIN) 20 MG tablet; Take 1 tablet (20 mg total) by mouth once daily.  Dispense: 90 tablet; Refill: 3    Mixed hyperlipidemia  -   refill  atorvastatin (LIPITOR) 40 MG tablet; Take 1 tablet (40 mg total) by mouth once daily.  Dispense: 90 tablet; Refill: 3         Follow up in about 8 weeks (around 8/2/2023).

## 2023-06-08 RX ORDER — CEPHALEXIN 500 MG/1
500 CAPSULE ORAL EVERY 8 HOURS
Qty: 21 CAPSULE | Refills: 0 | Status: SHIPPED | OUTPATIENT
Start: 2023-06-08 | End: 2023-07-24 | Stop reason: ALTCHOICE

## 2023-06-09 LAB — BACTERIA UR CULT: ABNORMAL

## 2023-06-15 DIAGNOSIS — R05.3 PERSISTENT COUGH FOR 3 WEEKS OR LONGER: ICD-10-CM

## 2023-06-15 RX ORDER — IPRATROPIUM BROMIDE 17 UG/1
AEROSOL, METERED RESPIRATORY (INHALATION)
Qty: 12.9 G | Refills: 0 | Status: SHIPPED | OUTPATIENT
Start: 2023-06-15 | End: 2023-07-13

## 2023-07-11 ENCOUNTER — TELEPHONE (OUTPATIENT)
Dept: FAMILY MEDICINE | Facility: CLINIC | Age: 68
End: 2023-07-11
Payer: MEDICARE

## 2023-07-11 RX ORDER — CLOBETASOL PROPIONATE 0.5 MG/G
OINTMENT TOPICAL
Qty: 30 G | Refills: 1 | Status: SHIPPED | OUTPATIENT
Start: 2023-07-11 | End: 2023-08-14

## 2023-07-11 NOTE — TELEPHONE ENCOUNTER
Ms. Schneider left . She slipped and fell in shower last night. Back of head sore, no lump. Raised area to back. She said she didn't see the need to go to ED, but wants to be seen if possible by you tomorrow. I attempted to call her back for more information, but it went to .

## 2023-07-11 NOTE — TELEPHONE ENCOUNTER
I was able to reach patient at 3:50 p.m. he denied any loss of consciousness, headache, nausea, vomiting, dizziness, blurred vision but did report some neck pain.  She states her boyfriend evaluated her and though he initially asked her to go to the emergency room they both felt that this was not necessary.  We discussed the possibility of further evaluation in an emergency room and or urgent care setting this evening.  She was given ER precautions for any of the symptoms described above and told to return to clinic tomorrow morning at 8:00 a.m. for in clinic evaluation.

## 2023-07-12 DIAGNOSIS — R05.3 PERSISTENT COUGH FOR 3 WEEKS OR LONGER: ICD-10-CM

## 2023-07-13 RX ORDER — IPRATROPIUM BROMIDE 17 UG/1
AEROSOL, METERED RESPIRATORY (INHALATION)
Qty: 12.9 G | Refills: 0 | Status: SHIPPED | OUTPATIENT
Start: 2023-07-13 | End: 2023-08-09

## 2023-07-24 ENCOUNTER — OFFICE VISIT (OUTPATIENT)
Dept: FAMILY MEDICINE | Facility: CLINIC | Age: 68
End: 2023-07-24
Payer: MEDICARE

## 2023-07-24 VITALS
HEART RATE: 81 BPM | RESPIRATION RATE: 18 BRPM | TEMPERATURE: 99 F | SYSTOLIC BLOOD PRESSURE: 100 MMHG | BODY MASS INDEX: 24.12 KG/M2 | DIASTOLIC BLOOD PRESSURE: 68 MMHG | OXYGEN SATURATION: 96 % | WEIGHT: 144.81 LBS | HEIGHT: 65 IN

## 2023-07-24 DIAGNOSIS — R00.0 RACING HEART BEAT: ICD-10-CM

## 2023-07-24 DIAGNOSIS — M54.2 NECK PAIN: ICD-10-CM
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
## 2023-07-24 DIAGNOSIS — F41.9 CHRONIC ANXIETY: ICD-10-CM

## 2023-07-24 DIAGNOSIS — M54.50 ACUTE LEFT-SIDED LOW BACK PAIN WITHOUT SCIATICA: ICD-10-CM

## 2023-07-24 LAB — TSH SERPL-ACNC: 0.98 UIU/ML (ref 0.35–4.94)

## 2023-07-24 PROCEDURE — 84443 ASSAY THYROID STIM HORMONE: CPT | Performed by: FAMILY MEDICINE

## 2023-07-24 PROCEDURE — 93005 ELECTROCARDIOGRAM TRACING: CPT

## 2023-07-24 PROCEDURE — 99215 OFFICE O/P EST HI 40 MIN: CPT | Mod: PBBFAC | Performed by: FAMILY MEDICINE

## 2023-07-24 PROCEDURE — 36415 COLL VENOUS BLD VENIPUNCTURE: CPT | Performed by: FAMILY MEDICINE

## 2023-07-24 NOTE — PROGRESS NOTES
Subjective:       Patient ID: Jennie Julio is a 67 y.o. female.    Chief Complaint: Fall (About 3 wks ago. Hitting her head. No c/o of dizziness, pain,memory loss at this time.)    HPI  66 y/o here with recent complaint of slip and fall in shower on July 10, 2023. Bath mat was folded in half and slipped on bathtub floor and fell backwards and hit head on right side and back and hit back on tub.  Did not go to emergency room or seek medical care on day of fall.  She was scheduled to be seen here 1 week ago but her boyfriend coded and had to be brought to the hospital and [facility] and required implantation of an AICD.  She has no complaints today except for mild residual left sided neck pain    Palpitations:  Has noticed sensation of elevated /racing heart and increased BP - attributes to anxiety - relates to stress .  Patient is followed by cardiologist, Lauryn, and has had prior workup including   Echocardiogram   07/21/2021:  Normal EF 55%, mild aortic valve calcification, mild TR  02/09/2023:  Normal EF 65%, mild aortic calcification, mild TR, grade 1 diastolic dysfunction; concentric LVH  Holter monitor 12/7/2022:  Average heart rate 90; no significant pauses during symptom of heart racing patient is in normal sinus rhythm or sinus tachycardia  Nuclear stress test  93 PVCs noted  90 PACs noted  Carotid ultrasound  1-39% stenosis bilaterally  Review of Systems   Respiratory:  Negative for cough, chest tightness and shortness of breath.    Cardiovascular:  Positive for palpitations. Negative for chest pain.   Endocrine: Negative for cold intolerance and heat intolerance.   Neurological:  Negative for dizziness, vertigo, tremors, seizures, syncope, facial asymmetry, speech difficulty, light-headedness, numbness, headaches, coordination difficulties, memory loss and coordination difficulties.            Objective:      Vitals:    07/24/23 1514   BP: 100/68   Pulse: 81   Resp: 18   Temp: 98.6 °F (37 °C)   /68 (BP  "Location: Right arm, Patient Position: Sitting, BP Method: Medium (Automatic))   Pulse 81   Temp 98.6 °F (37 °C) (Oral)   Resp 18   Ht 5' 5" (1.651 m)   Wt 65.7 kg (144 lb 12.8 oz)   SpO2 96%   BMI 24.10 kg/m²       Physical Exam  Constitutional:       General: She is not in acute distress.     Appearance: She is normal weight. She is not ill-appearing, toxic-appearing or diaphoretic.   HENT:      Head:      Comments: No palpable swelling, step-off or deformity ; mild trapezius muscle tenderness on the left  Full range of motion of cervical spine noted without tenderness over spinous process; mild scalp tenderness but without hematoma ; no abrasions of scalp  Cardiovascular:      Rate and Rhythm: Normal rate and regular rhythm.      Heart sounds:      No friction rub. No gallop.   Pulmonary:      Effort: Pulmonary effort is normal. No respiratory distress.      Breath sounds: Normal breath sounds. No wheezing or rales.   Musculoskeletal:         General: No swelling.      Cervical back: Normal range of motion and neck supple. No edema, rigidity or crepitus. Muscular tenderness present. No pain with movement or spinous process tenderness.   Neurological:      Mental Status: She is alert and oriented to person, place, and time.      Gait: Gait normal.   Psychiatric:         Mood and Affect: Mood normal.         Behavior: Behavior normal.         Thought Content: Thought content normal.         Judgment: Judgment normal.         EXAMINATION:  XR CERVICAL SPINE AP LATERAL     CLINICAL HISTORY:  Cervicalgia     COMPARISON:  None.     FINDINGS:  There is slight reversal of normal cervical lordosis.  The vertebral body heights and disc spaces are maintained.  There are small multilevel marginal osteophytes.  There is mild bilateral facet arthropathy.  The soft tissues are un rim     Impression:     1. No acute abnormality identified.  2. Mild degenerative changes of the cervical spine.        Electronically signed " by: Katy Corley  Date:                                            07/25/2023  Time:                                           15:01  Assessment/Plan:  Fall, initial encounter  Neck pain  -     X-Ray Cervical Spine AP And Lateral-straightening of normal cervical lordosis and evidence of chronic degenerative change without acute fracture or other acute abnormality    Acute left-sided low back pain without sciatica         -    symptoms stable and unrelated to fall  Racing heart beat  -     IN OFFICE EKG 12-LEAD (to Muse)-normal sinus rhythm without acute changes  -     TSH 0.976 which is within normal limits  Chronic anxiety        -      continue Effexor XR 75 mg  ED precautions given for any dizziness, lightheadedness, nausea, vomiting, blurry vision, recurring headache or any concerning symptoms including worsening of palpitations   Keep appt next week   Follow up in about 3 months (around 10/24/2023).

## 2023-07-25 ENCOUNTER — HOSPITAL ENCOUNTER (OUTPATIENT)
Dept: RADIOLOGY | Facility: HOSPITAL | Age: 68
Discharge: HOME | End: 2023-07-25
Attending: FAMILY MEDICINE
Payer: MEDICARE

## 2023-07-25 DIAGNOSIS — M54.2 NECK PAIN: ICD-10-CM

## 2023-07-25 PROCEDURE — 72040 X-RAY EXAM NECK SPINE 2-3 VW: CPT | Mod: TC

## 2023-08-09 DIAGNOSIS — R05.3 PERSISTENT COUGH FOR 3 WEEKS OR LONGER: ICD-10-CM

## 2023-08-09 RX ORDER — IPRATROPIUM BROMIDE 17 UG/1
AEROSOL, METERED RESPIRATORY (INHALATION)
Qty: 12.9 G | Refills: 0 | Status: SHIPPED | OUTPATIENT
Start: 2023-08-09 | End: 2023-09-01

## 2023-08-14 RX ORDER — CLOBETASOL PROPIONATE 0.5 MG/G
OINTMENT TOPICAL
Qty: 30 G | Refills: 1 | Status: SHIPPED | OUTPATIENT
Start: 2023-08-14 | End: 2023-09-11 | Stop reason: SDUPTHER

## 2023-09-01 DIAGNOSIS — R05.3 PERSISTENT COUGH FOR 3 WEEKS OR LONGER: ICD-10-CM

## 2023-09-01 RX ORDER — IPRATROPIUM BROMIDE 17 UG/1
AEROSOL, METERED RESPIRATORY (INHALATION)
Qty: 12.9 G | Refills: 0 | Status: SHIPPED | OUTPATIENT
Start: 2023-09-01 | End: 2024-02-05

## 2023-09-07 ENCOUNTER — PATIENT MESSAGE (OUTPATIENT)
Dept: RESEARCH | Facility: HOSPITAL | Age: 68
End: 2023-09-07
Payer: MEDICARE

## 2023-09-11 RX ORDER — CLOBETASOL PROPIONATE 0.5 MG/G
OINTMENT TOPICAL
Qty: 30 G | Refills: 1 | Status: SHIPPED | OUTPATIENT
Start: 2023-09-11 | End: 2023-10-09 | Stop reason: SDUPTHER

## 2023-09-11 RX ORDER — GABAPENTIN 100 MG/1
CAPSULE ORAL
Qty: 150 CAPSULE | Refills: 3 | Status: SHIPPED | OUTPATIENT
Start: 2023-09-11 | End: 2024-02-05 | Stop reason: SDUPTHER

## 2023-10-09 RX ORDER — CLOBETASOL PROPIONATE 0.5 MG/G
OINTMENT TOPICAL
Qty: 30 G | Refills: 1 | Status: SHIPPED | OUTPATIENT
Start: 2023-10-09 | End: 2023-11-06

## 2023-10-24 ENCOUNTER — TELEPHONE (OUTPATIENT)
Dept: FAMILY MEDICINE | Facility: CLINIC | Age: 68
End: 2023-10-24
Payer: MEDICARE

## 2023-10-25 ENCOUNTER — OFFICE VISIT (OUTPATIENT)
Dept: FAMILY MEDICINE | Facility: CLINIC | Age: 68
End: 2023-10-25
Payer: MEDICARE

## 2023-10-25 VITALS
HEIGHT: 65 IN | RESPIRATION RATE: 18 BRPM | HEART RATE: 65 BPM | WEIGHT: 147.81 LBS | DIASTOLIC BLOOD PRESSURE: 76 MMHG | OXYGEN SATURATION: 100 % | BODY MASS INDEX: 24.63 KG/M2 | SYSTOLIC BLOOD PRESSURE: 116 MMHG | TEMPERATURE: 98 F

## 2023-10-25 DIAGNOSIS — L72.0 EPIDERMAL INCLUSION CYST: ICD-10-CM

## 2023-10-25 DIAGNOSIS — Z12.31 BREAST CANCER SCREENING BY MAMMOGRAM: ICD-10-CM

## 2023-10-25 DIAGNOSIS — M54.41 CHRONIC BILATERAL LOW BACK PAIN WITH RIGHT-SIDED SCIATICA: ICD-10-CM

## 2023-10-25 DIAGNOSIS — G89.29 CHRONIC BILATERAL LOW BACK PAIN WITH RIGHT-SIDED SCIATICA: ICD-10-CM

## 2023-10-25 DIAGNOSIS — Z23 NEED FOR INFLUENZA VACCINATION: ICD-10-CM

## 2023-10-25 DIAGNOSIS — I10 PRIMARY HYPERTENSION: Primary | ICD-10-CM

## 2023-10-25 DIAGNOSIS — F32.A MILD DEPRESSION: ICD-10-CM

## 2023-10-25 DIAGNOSIS — F41.9 CHRONIC ANXIETY: ICD-10-CM

## 2023-10-25 LAB
ALBUMIN SERPL-MCNC: 3.8 G/DL (ref 3.4–4.8)
ALBUMIN/GLOB SERPL: 1.3 RATIO (ref 1.1–2)
ALP SERPL-CCNC: 73 UNIT/L (ref 40–150)
ALT SERPL-CCNC: 14 UNIT/L (ref 0–55)
AST SERPL-CCNC: 16 UNIT/L (ref 5–34)
BASOPHILS # BLD AUTO: 0.03 X10(3)/MCL
BASOPHILS NFR BLD AUTO: 0.7 %
BILIRUB SERPL-MCNC: 0.7 MG/DL
BUN SERPL-MCNC: 12.2 MG/DL (ref 9.8–20.1)
CALCIUM SERPL-MCNC: 9.4 MG/DL (ref 8.4–10.2)
CHLORIDE SERPL-SCNC: 105 MMOL/L (ref 98–107)
CHOLEST SERPL-MCNC: 158 MG/DL
CHOLEST/HDLC SERPL: 2 {RATIO} (ref 0–5)
CO2 SERPL-SCNC: 29 MMOL/L (ref 23–31)
CREAT SERPL-MCNC: 0.69 MG/DL (ref 0.55–1.02)
EOSINOPHIL # BLD AUTO: 0.2 X10(3)/MCL (ref 0–0.9)
EOSINOPHIL NFR BLD AUTO: 4.6 %
ERYTHROCYTE [DISTWIDTH] IN BLOOD BY AUTOMATED COUNT: 13.5 % (ref 11.5–17)
GFR SERPLBLD CREATININE-BSD FMLA CKD-EPI: >60 MLS/MIN/1.73/M2
GLOBULIN SER-MCNC: 3 GM/DL (ref 2.4–3.5)
GLUCOSE SERPL-MCNC: 76 MG/DL (ref 82–115)
HCT VFR BLD AUTO: 42.5 % (ref 37–47)
HDLC SERPL-MCNC: 65 MG/DL (ref 35–60)
HGB BLD-MCNC: 13.9 G/DL (ref 12–16)
IMM GRANULOCYTES # BLD AUTO: 0 X10(3)/MCL (ref 0–0.04)
IMM GRANULOCYTES NFR BLD AUTO: 0 %
LDLC SERPL CALC-MCNC: 84 MG/DL (ref 50–140)
LYMPHOCYTES # BLD AUTO: 1.76 X10(3)/MCL (ref 0.6–4.6)
LYMPHOCYTES NFR BLD AUTO: 40.6 %
MCH RBC QN AUTO: 29.8 PG (ref 27–31)
MCHC RBC AUTO-ENTMCNC: 32.7 G/DL (ref 33–36)
MCV RBC AUTO: 91 FL (ref 80–94)
MONOCYTES # BLD AUTO: 0.5 X10(3)/MCL (ref 0.1–1.3)
MONOCYTES NFR BLD AUTO: 11.5 %
NEUTROPHILS # BLD AUTO: 1.84 X10(3)/MCL (ref 2.1–9.2)
NEUTROPHILS NFR BLD AUTO: 42.6 %
NRBC BLD AUTO-RTO: 0 %
PLATELET # BLD AUTO: 298 X10(3)/MCL (ref 130–400)
PMV BLD AUTO: 9.6 FL (ref 7.4–10.4)
POTASSIUM SERPL-SCNC: 4.5 MMOL/L (ref 3.5–5.1)
PROT SERPL-MCNC: 6.8 GM/DL (ref 5.8–7.6)
RBC # BLD AUTO: 4.67 X10(6)/MCL (ref 4.2–5.4)
SODIUM SERPL-SCNC: 139 MMOL/L (ref 136–145)
TRIGL SERPL-MCNC: 47 MG/DL (ref 37–140)
VLDLC SERPL CALC-MCNC: 9 MG/DL
WBC # SPEC AUTO: 4.33 X10(3)/MCL (ref 4.5–11.5)

## 2023-10-25 PROCEDURE — 85025 COMPLETE CBC W/AUTO DIFF WBC: CPT | Performed by: FAMILY MEDICINE

## 2023-10-25 PROCEDURE — 99215 OFFICE O/P EST HI 40 MIN: CPT | Mod: PBBFAC,25 | Performed by: FAMILY MEDICINE

## 2023-10-25 PROCEDURE — 80053 COMPREHEN METABOLIC PANEL: CPT | Performed by: FAMILY MEDICINE

## 2023-10-25 PROCEDURE — G0008 ADMIN INFLUENZA VIRUS VAC: HCPCS | Mod: PBBFAC

## 2023-10-25 PROCEDURE — 36415 COLL VENOUS BLD VENIPUNCTURE: CPT | Performed by: FAMILY MEDICINE

## 2023-10-25 PROCEDURE — 80061 LIPID PANEL: CPT | Performed by: FAMILY MEDICINE

## 2023-10-25 PROCEDURE — 90694 VACC AIIV4 NO PRSRV 0.5ML IM: CPT | Mod: PBBFAC

## 2023-10-25 RX ORDER — VENLAFAXINE HYDROCHLORIDE 75 MG/1
75 CAPSULE, EXTENDED RELEASE ORAL DAILY
Qty: 30 CAPSULE | Refills: 5 | Status: SHIPPED | OUTPATIENT
Start: 2023-10-25 | End: 2023-12-31 | Stop reason: SDUPTHER

## 2023-10-25 RX ADMIN — INFLUENZA A VIRUS A/VICTORIA/4897/2022 IVR-238 (H1N1) ANTIGEN (FORMALDEHYDE INACTIVATED), INFLUENZA A VIRUS A/DARWIN/6/2021 IVR-227 (H3N2) ANTIGEN (FORMALDEHYDE INACTIVATED), INFLUENZA B VIRUS B/AUSTRIA/1359417/2021 BVR-26 ANTIGEN (FORMALDEHYDE INACTIVATED), INFLUENZA B VIRUS B/PHUKET/3073/2013 BVR-1B ANTIGEN (FORMALDEHYDE INACTIVATED) 0.5 ML: 15; 15; 15; 15 INJECTION, SUSPENSION INTRAMUSCULAR at 09:10

## 2023-10-25 NOTE — PROGRESS NOTES
"Subjective:       Patient ID: Jennie Julio is a 67 y.o. female.    Chief Complaint: Follow-up (Multiple medical conditions. ) and Flu Vaccine (Needed)    HPI  Jennie is an established patient who presents today for follow-up of chronic anxiety, onychomycosis, osteopenia, and HTN with intermittently elevated BP.  HTN  Controlled on benazepril 20 mg   /76  ASA 81 mg QOD dur yo bruising   Atorvastatin 40 mg daily   Chronic situational anxiety and depression  Well controlled on Effexor  Now looking for engagement rings for commitment to boyfriend   Denies any SI/HI  Osteopenia   BMD  1/18/2023   BMD     T-score  0.960 -1.9.  Lumbar Spine (L1-L4)  0.932 -0.6.  Total Left Femur  0.943 -0.5.  Total Right Femur  0.937 -0.6.  Mean Femur  FRAX Score   Persistent cough: significantly improved/resolved  Nonsmoker  Chest x-ray 12/13/2022 without acute cardiopulmonary process  Pulmonary function test 01/30/2023  -No obstruction  -Mild restriction  -No response to bronchodilator  -No diffusion defect after correction  Patient has been using Atrovent inhaler since last visit with benefit  Onychomycosis and tinea pedis  Markedly improved with oral Lamisil x 90 days  Completed meds  Review of Systems         Objective:      Vitals:    10/25/23 0847   BP: 116/76   Pulse: 65   Resp: 18   Temp: 97.9 °F (36.6 °C)   /76 (BP Location: Right arm, Patient Position: Sitting, BP Method: Medium (Automatic))   Pulse 65   Temp 97.9 °F (36.6 °C) (Oral)   Resp 18   Ht 5' 5" (1.651 m)   Wt 67 kg (147 lb 12.8 oz)   SpO2 100%   BMI 24.60 kg/m² '    Physical Exam  Constitutional:       General: She is not in acute distress.     Appearance: She is not ill-appearing, toxic-appearing or diaphoretic.   Cardiovascular:      Rate and Rhythm: Normal rate and regular rhythm.      Pulses: Normal pulses.      Heart sounds: Normal heart sounds.      No gallop.   Musculoskeletal:      Right lower leg: No edema.      Left lower leg: No edema.   Skin:   "   Comments: Palpable 0.8 cm cyst neck    Neurological:      Mental Status: She is alert.           JIMENA-7 score 2  PHQ-9 score 0      CMP glucose normal except glucose 76  CBC 4.33    HDL 65 TG 47 LDL 84      Assessment/Plan:  Primary hypertension  -     Continue benazepril 20 mg daily   Chronic anxiety  Mild depression  Doing well without SI/HI   Refill venlafaxine   -     venlafaxine (EFFEXOR-XR) 75 MG 24 hr capsule; Take 1 capsule (75 mg total) by mouth once daily.  Dispense: 30 capsule; Refill: 5  Epidermal inclusion cyst neck   -     Ambulatory referral/consult to Family Practice  Chronic bilateral low back pain with right-sided sciatica  Symptoms stable   Breast cancer screening by mammogram  -     Mammo Digital Screening Bilat w/ Crescencio; Future; Expected date: 12/11/2023    Need for influenza vaccination  -     influenza 65up-adj (QUADRIVALENT ADJUVANTED PF) vaccine 0.5 mL  Follow up in about 2 months (around 12/25/2023).

## 2023-11-06 RX ORDER — CLOBETASOL PROPIONATE 0.5 MG/G
OINTMENT TOPICAL
Qty: 30 G | Refills: 1 | Status: SHIPPED | OUTPATIENT
Start: 2023-11-06 | End: 2024-01-05

## 2023-12-19 ENCOUNTER — HOSPITAL ENCOUNTER (EMERGENCY)
Facility: HOSPITAL | Age: 68
Discharge: HOME | End: 2023-12-19
Attending: EMERGENCY MEDICINE
Payer: MEDICARE

## 2023-12-19 VITALS
SYSTOLIC BLOOD PRESSURE: 132 MMHG | RESPIRATION RATE: 18 BRPM | BODY MASS INDEX: 24.61 KG/M2 | HEART RATE: 74 BPM | HEIGHT: 65 IN | DIASTOLIC BLOOD PRESSURE: 74 MMHG | TEMPERATURE: 98 F | WEIGHT: 147.69 LBS | OXYGEN SATURATION: 98 %

## 2023-12-19 DIAGNOSIS — R42 DIZZINESS: ICD-10-CM

## 2023-12-19 DIAGNOSIS — J32.9 SINUSITIS, UNSPECIFIED CHRONICITY, UNSPECIFIED LOCATION: Primary | ICD-10-CM

## 2023-12-19 LAB
ALBUMIN SERPL-MCNC: 3.6 G/DL (ref 3.4–4.8)
ALBUMIN/GLOB SERPL: 1.1 RATIO (ref 1.1–2)
ALP SERPL-CCNC: 82 UNIT/L (ref 40–150)
ALT SERPL-CCNC: 11 UNIT/L (ref 0–55)
AST SERPL-CCNC: 13 UNIT/L (ref 5–34)
BASOPHILS # BLD AUTO: 0.01 X10(3)/MCL
BASOPHILS NFR BLD AUTO: 0.2 %
BILIRUB SERPL-MCNC: 0.3 MG/DL
BUN SERPL-MCNC: 12.6 MG/DL (ref 9.8–20.1)
CALCIUM SERPL-MCNC: 9.3 MG/DL (ref 8.4–10.2)
CHLORIDE SERPL-SCNC: 108 MMOL/L (ref 98–107)
CO2 SERPL-SCNC: 28 MMOL/L (ref 23–31)
CREAT SERPL-MCNC: 0.67 MG/DL (ref 0.55–1.02)
EOSINOPHIL # BLD AUTO: 0.14 X10(3)/MCL (ref 0–0.9)
EOSINOPHIL NFR BLD AUTO: 3.2 %
ERYTHROCYTE [DISTWIDTH] IN BLOOD BY AUTOMATED COUNT: 13 % (ref 11.5–17)
GFR SERPLBLD CREATININE-BSD FMLA CKD-EPI: >60 MLS/MIN/1.73/M2
GLOBULIN SER-MCNC: 3.2 GM/DL (ref 2.4–3.5)
GLUCOSE SERPL-MCNC: 113 MG/DL (ref 82–115)
HCT VFR BLD AUTO: 41.4 % (ref 37–47)
HGB BLD-MCNC: 13.7 G/DL (ref 12–16)
HOLD SPECIMEN: NORMAL
IMM GRANULOCYTES # BLD AUTO: 0.01 X10(3)/MCL (ref 0–0.04)
IMM GRANULOCYTES NFR BLD AUTO: 0.2 %
LYMPHOCYTES # BLD AUTO: 1.18 X10(3)/MCL (ref 0.6–4.6)
LYMPHOCYTES NFR BLD AUTO: 26.6 %
MCH RBC QN AUTO: 29.8 PG (ref 27–31)
MCHC RBC AUTO-ENTMCNC: 33.1 G/DL (ref 33–36)
MCV RBC AUTO: 90 FL (ref 80–94)
MONOCYTES # BLD AUTO: 0.46 X10(3)/MCL (ref 0.1–1.3)
MONOCYTES NFR BLD AUTO: 10.4 %
NEUTROPHILS # BLD AUTO: 2.64 X10(3)/MCL (ref 2.1–9.2)
NEUTROPHILS NFR BLD AUTO: 59.4 %
NRBC BLD AUTO-RTO: 0 %
PLATELET # BLD AUTO: 298 X10(3)/MCL (ref 130–400)
PMV BLD AUTO: 9.2 FL (ref 7.4–10.4)
POTASSIUM SERPL-SCNC: 4.5 MMOL/L (ref 3.5–5.1)
PROT SERPL-MCNC: 6.8 GM/DL (ref 5.8–7.6)
RBC # BLD AUTO: 4.6 X10(6)/MCL (ref 4.2–5.4)
SODIUM SERPL-SCNC: 142 MMOL/L (ref 136–145)
WBC # SPEC AUTO: 4.44 X10(3)/MCL (ref 4.5–11.5)

## 2023-12-19 PROCEDURE — 85025 COMPLETE CBC W/AUTO DIFF WBC: CPT | Performed by: NURSE PRACTITIONER

## 2023-12-19 PROCEDURE — 80053 COMPREHEN METABOLIC PANEL: CPT | Performed by: NURSE PRACTITIONER

## 2023-12-19 PROCEDURE — 99285 EMERGENCY DEPT VISIT HI MDM: CPT | Mod: 25

## 2023-12-19 RX ORDER — AMOXICILLIN AND CLAVULANATE POTASSIUM 875; 125 MG/1; MG/1
1 TABLET, FILM COATED ORAL 2 TIMES DAILY
Qty: 14 TABLET | Refills: 0 | Status: SHIPPED | OUTPATIENT
Start: 2023-12-19 | End: 2024-01-26 | Stop reason: ALTCHOICE

## 2023-12-19 RX ORDER — MECLIZINE HCL 12.5 MG 12.5 MG/1
12.5 TABLET ORAL 3 TIMES DAILY PRN
Qty: 21 TABLET | Refills: 0 | Status: SHIPPED | OUTPATIENT
Start: 2023-12-19 | End: 2024-02-05

## 2023-12-20 ENCOUNTER — OFFICE VISIT (OUTPATIENT)
Dept: FAMILY MEDICINE | Facility: CLINIC | Age: 68
End: 2023-12-20
Payer: MEDICARE

## 2023-12-20 VITALS
RESPIRATION RATE: 18 BRPM | SYSTOLIC BLOOD PRESSURE: 126 MMHG | OXYGEN SATURATION: 100 % | BODY MASS INDEX: 24.76 KG/M2 | HEIGHT: 65 IN | TEMPERATURE: 98 F | HEART RATE: 72 BPM | DIASTOLIC BLOOD PRESSURE: 78 MMHG | WEIGHT: 148.63 LBS

## 2023-12-20 DIAGNOSIS — R42 DIZZINESS: ICD-10-CM

## 2023-12-20 DIAGNOSIS — R94.31 ABNORMAL ECG: ICD-10-CM

## 2023-12-20 DIAGNOSIS — I10 PRIMARY HYPERTENSION: Primary | ICD-10-CM

## 2023-12-20 DIAGNOSIS — J01.00 ACUTE NON-RECURRENT MAXILLARY SINUSITIS: ICD-10-CM

## 2023-12-20 DIAGNOSIS — F41.9 CHRONIC ANXIETY: ICD-10-CM

## 2023-12-20 PROCEDURE — 99214 OFFICE O/P EST MOD 30 MIN: CPT | Mod: PBBFAC | Performed by: FAMILY MEDICINE

## 2023-12-20 NOTE — PROGRESS NOTES
Orthostatic Blood Pressure Readings    Lying   155/93  HR 68  Sitting  145/89  HR  66  Standing 144/86  HR 84

## 2023-12-20 NOTE — PROGRESS NOTES
Subjective:       Patient ID: Jennie Julio is a 68 y.o. female.    Chief Complaint: Follow-up (Pt had ED visit on 12/19. Dizziness. CT Scan, Lab and EKG done. )    HPI  BRAXTON Schneider is an established patient who presents today for follow-up of chronic anxiety, onychomycosis, osteopenia, and HTN with intermittently elevated BP.  She was seen in the emergency department 12/19/2023 with complaint of acute onset dizziness.  Her BP was incidentally noted to be mildly elevated at 145/85.  She also reported sinus pressure and runny nose.  CT head without contrast revealed no appreciable acute intracranial abnormality.  She was prescribed Augmentin the ED for 7 days and given Antivert p.r.n. she feels better today.  An EKG done in the ED was significant for inverted T-waves in the inferior leads which is a change from her prior ECG 07/24/2023.  She denies chest pain, shortness breast, palpitations or edema extremities.  She has been trying to lose weight and has been successful in his maintaining her weight loss.  He has no other complaints or concerns today.  Her sister has been hospitalized in his being discharged from the hospital to a skilled nursing facility today  HTN  Controlled on benazepril 20 mg   /76  ASA 81 mg QOD dur yo bruising   Atorvastatin 40 mg daily      06/02/2022  12/13/2022  10/25/2023     12/20/2023    BMI        29.29     25.13     24.60       24.73  Wt 170 lb 10.2 oz  151 lb  147 lb 12.8 oz     148 lb 9.6 oz      Chronic situational anxiety and depression  Well controlled on Effexor  Now wearing a commitment ring to her boyfriend.  They have elected not to get   Denies any SI/HI  Osteopenia   BMD  1/18/2023   BMD     T-score  0.960 -1.9.  Lumbar Spine (L1-L4)  0.932 -0.6.  Total Left Femur  0.943 -0.5.  Total Right Femur  0.937 -0.6.  Mean Femur  FRAX Score   Persistent cough: significantly improved/resolved  Nonsmoker  Chest x-ray 12/13/2022 without acute cardiopulmonary process  Pulmonary  "function test 01/30/2023  -No obstruction  -Mild restriction  -No response to bronchodilator  -No diffusion defect after correction  Patient has been using Atrovent inhaler since last visit with benefit  Onychomycosis and tinea pedis  Markedly improved with oral Lamisil x 90 days  Completed meds  Review of Systems         Objective:      Vitals:    12/20/23 1212   BP: 126/78   Pulse:    Resp:    Temp:    /78 (BP Location: Right arm, Patient Position: Sitting, BP Method: Medium (Manual))   Pulse 72   Temp 97.9 °F (36.6 °C) (Oral)   Resp 18   Ht 5' 5" (1.651 m)   Wt 67.4 kg (148 lb 9.6 oz)   SpO2 100%   BMI 24.73 kg/m²       Physical Exam  Constitutional:       General: She is not in acute distress.     Appearance: She is normal weight. She is not ill-appearing, toxic-appearing or diaphoretic.   Cardiovascular:      Rate and Rhythm: Normal rate and regular rhythm.      Pulses: Normal pulses.      Heart sounds: Normal heart sounds. No murmur heard.     No friction rub.   Pulmonary:      Effort: Pulmonary effort is normal. No respiratory distress.      Breath sounds: No stridor. No wheezing, rhonchi or rales.   Chest:      Chest wall: No tenderness.   Neurological:      Mental Status: She is alert and oriented to person, place, and time.   Psychiatric:         Mood and Affect: Mood normal.         Behavior: Behavior normal.         Thought Content: Thought content normal.         Judgment: Judgment normal.           Assessment/Plan:  Primary hypertension  Well-controlled today  Continue benazepril 20 mg daily  Continue to monitor at home  Dizziness  Transient and appears resolved  Return to clinic/ED precautions discussed with patient  Keep scheduled follow-up 01/03/2024  Abnormal ECG  Due to new ST changes, will refer back to Cardiology for review  Previously had negative nuclear stress test and Holter monitor  Continue ASA 81 mg q.o.d.  Continue atorvastatin 40 mg daily  Acute non-recurrent maxillary " sinusitis  CT head unremarkable for sinus disease  Discuss risk benefit of continuing antibiotic as prescribed  As patient is feeling better since starting antibiotic, we will continue  Chronic anxiety  Continue Effexor XR 75 mg  Ensure taking consistently at the same time every day as failure to do so may cause disorientation/dizziness       Follow up in about 2 weeks (around 1/3/2024) for Patient already has a scheduled appointment.

## 2023-12-20 NOTE — ED PROVIDER NOTES
Encounter Date: 12/19/2023       History     Chief Complaint   Patient presents with    Dizziness    Headache     Dizziness, posterior head pain, pressure around eyes, and  left ear discomfort (x)2 hours ago. Marquise aiken     Pt is a 68 y.o. female who presents to the [facility] ED complaining of dizziness, sinus pain, and Lt ear pain. Symptoms began approx 2 hours prior to arrival. Pt with Hx of anxiety, HTN. Very anxious of intracranial changes. Admits to having an appointment tomorrow with her PCP however she was scared to wait. Denies chest pain, SOB, weakness, dizziness, or loss of bowel or bladder control. Reports mild sinus congestion for multiple days prior to pain and dizziness symptoms.       Review of patient's allergies indicates:   Allergen Reactions    Sulfa (sulfonamide antibiotics) Nausea And Vomiting     Other reaction(s): Nausea, vomiting and diarrhea     Past Medical History:   Diagnosis Date    Chronic anxiety 5/16/2022    Dystrophic nail 12/13/2022    History of pneumonia 12/13/2022    Low back pain with sciatica 5/16/2022    Mild depression 5/16/2022    Mitral valve prolapse 5/16/2022    Mixed hyperlipidemia 5/16/2022    Osteoarthritis of ankle 5/16/2022    Persistent cough 1/30/2023    Personal history of colonic polyps 06/10/2013    Primary hypertension 5/16/2022    Varicose veins of lower extremity 5/16/2022     Past Surgical History:   Procedure Laterality Date    COLONOSCOPY  06/10/2013    COLONOSCOPY  05/15/2019     No family history on file.  Social History     Tobacco Use    Smoking status: Never    Smokeless tobacco: Never   Substance Use Topics    Alcohol use: Not Currently    Drug use: Never     Review of Systems   Constitutional:  Negative for chills, diaphoresis, fatigue and fever.   HENT:  Positive for sinus pressure and sinus pain. Negative for facial swelling, rhinorrhea, sore throat and trouble swallowing.    Respiratory:  Negative for cough, chest tightness, shortness of breath and  wheezing.    Cardiovascular:  Negative for chest pain, palpitations and leg swelling.   Gastrointestinal:  Negative for abdominal pain, diarrhea, nausea and vomiting.   Genitourinary:  Negative for dysuria, flank pain, frequency, hematuria and urgency.   Musculoskeletal:  Negative for arthralgias, back pain, joint swelling and myalgias.   Skin:  Negative for color change and rash.   Neurological:  Positive for dizziness. Negative for syncope, weakness and light-headedness.   Hematological:  Does not bruise/bleed easily.   All other systems reviewed and are negative.      Physical Exam     Initial Vitals [12/19/23 1637]   BP Pulse Resp Temp SpO2   (!) 145/85 82 18 97.6 °F (36.4 °C) 98 %      MAP       --         Physical Exam    Nursing note and vitals reviewed.  Constitutional: She appears well-developed and well-nourished.   HENT:   Head: Normocephalic and atraumatic.       Nose: Mucosal edema and rhinorrhea present. Right sinus exhibits maxillary sinus tenderness. Left sinus exhibits maxillary sinus tenderness.   Mouth/Throat: Oropharynx is clear and moist.   Erythema to bilateral nares.      Eyes: Conjunctivae and EOM are normal. Pupils are equal, round, and reactive to light.   Neck: Neck supple.   Normal range of motion.  Cardiovascular:  Normal rate, regular rhythm, normal heart sounds and intact distal pulses.           Pulmonary/Chest: Effort normal and breath sounds normal. No respiratory distress. She has no wheezes. She has no rhonchi. She has no rales. She exhibits no tenderness.   Abdominal: Abdomen is soft and flat. Bowel sounds are normal. She exhibits no distension. There is no abdominal tenderness. There is no rebound, no guarding, no tenderness at McBurney's point and negative Dias's sign. negative psoas sign  Musculoskeletal:         General: Normal range of motion.      Cervical back: Normal range of motion and neck supple.     Neurological: She is alert and oriented to person, place, and time.  She has normal strength and normal reflexes.   Skin: Skin is warm and dry. Capillary refill takes less than 2 seconds.   Psychiatric: She has a normal mood and affect. Her speech is normal and behavior is normal. Judgment and thought content normal.         ED Course   Procedures  Labs Reviewed   COMPREHENSIVE METABOLIC PANEL - Abnormal; Notable for the following components:       Result Value    Chloride 108 (*)     All other components within normal limits   CBC WITH DIFFERENTIAL - Abnormal; Notable for the following components:    WBC 4.44 (*)     All other components within normal limits   CBC W/ AUTO DIFFERENTIAL    Narrative:     The following orders were created for panel order CBC auto differential.  Procedure                               Abnormality         Status                     ---------                               -----------         ------                     CBC with Differential[[id number]]       Abnormal            Final result                 Please view results for these tests on the individual orders.   EXTRA TUBES    Narrative:     The following orders were created for panel order EXTRA TUBES.  Procedure                               Abnormality         Status                     ---------                               -----------         ------                     Light Blue Top Hold[[id number]]                             Final result               Light Green Top Hold[[id number]]                            Final result               Gold Top Hold[[id number]]                                   Final result                 Please view results for these tests on the individual orders.   LIGHT BLUE TOP HOLD   LIGHT GREEN TOP HOLD   GOLD TOP HOLD          Imaging Results              CT Head Without Contrast (Final result)  Result time 12/19/23 19:36:27      Final result by Jenny Willard MD (12/19/23 19:36:27)                   Impression:      No appreciable acute intracranial  abnormality.      Electronically signed by: Jenny Willard  Date:    12/19/2023  Time:    19:36               Narrative:    EXAMINATION:  CT HEAD WITHOUT CONTRAST    CLINICAL HISTORY:  Dizziness, persistent/recurrent, cardiac or vascular cause suspected;    TECHNIQUE:  Low dose axial CT images obtained throughout the head without intravenous contrast.  Axial, sagittal and coronal reconstructions were performed and interpreted.    DLP: 1012 mGycm    All CT scans at this location are performed using dose optimization techniques as appropriate to a performed exam including the following automated exposure control, adjustment of the mA and/or kV according to patient size and/or use of iterative reconstruction technique    COMPARISON:  No relevant prior available for comparison.    FINDINGS:  BRAIN: Gray white differentiation is maintained. White matter is within normal limits for age.  No hemorrhage. No edema. No mass effect or midline shift.  The posterior fossa and midline structures are unremarkable.    VENTRICLES: Normal in size and configuration.    EXTRA-AXIAL: No abnormal extra-axial collections.    BONES: Calvarium is intact.    SINUSES AND MASTOIDS: Visualized paranasal sinuses and mastoid air cells are clear.                                       Medications - No data to display  Medical Decision Making  Differential:  Sinusitis  Vertigo  Anxiety      Amount and/or Complexity of Data Reviewed  Labs: ordered.  Radiology: ordered.                                      Clinical Impression:  Final diagnoses:  [R42] Dizziness  [J32.9] Sinusitis, unspecified chronicity, unspecified location (Primary)          ED Disposition Condition    Discharge Stable          ED Prescriptions       Medication Sig Dispense Start Date End Date Auth. Provider    amoxicillin-clavulanate 875-125mg (AUGMENTIN) 875-125 mg per tablet Take 1 tablet by mouth 2 (two) times daily. 14 tablet 12/19/2023 -- Rolf Staton Jr., FNP     meclizine (ANTIVERT) 12.5 mg tablet Take 1 tablet (12.5 mg total) by mouth 3 (three) times daily as needed for Dizziness. 21 tablet 12/19/2023 -- Rolf Staton Jr., TERESITA          Follow-up Information       Follow up With Specialties Details Why Contact Info    Soni Montano MD Family Medicine In 3 days  [address] [facility] [address]  [phone]      [facility] Emergency Medicine In 3 days As needed, If symptoms worsen [address] [facility] [id number] [phone]             Rolf Staton Jr., TERESITA  12/19/23 2003

## 2023-12-31 DIAGNOSIS — F41.9 CHRONIC ANXIETY: Primary | ICD-10-CM

## 2023-12-31 RX ORDER — VENLAFAXINE HYDROCHLORIDE 75 MG/1
75 CAPSULE, EXTENDED RELEASE ORAL DAILY
Qty: 30 CAPSULE | Refills: 0 | Status: SHIPPED | OUTPATIENT
Start: 2023-12-31 | End: 2024-02-05 | Stop reason: SDUPTHER

## 2024-01-03 ENCOUNTER — OFFICE VISIT (OUTPATIENT)
Dept: FAMILY MEDICINE | Facility: CLINIC | Age: 69
End: 2024-01-03
Payer: MEDICARE

## 2024-01-03 VITALS
DIASTOLIC BLOOD PRESSURE: 76 MMHG | WEIGHT: 152.81 LBS | OXYGEN SATURATION: 99 % | TEMPERATURE: 98 F | SYSTOLIC BLOOD PRESSURE: 120 MMHG | BODY MASS INDEX: 25.46 KG/M2 | HEART RATE: 76 BPM | RESPIRATION RATE: 18 BRPM | HEIGHT: 65 IN

## 2024-01-03 DIAGNOSIS — M50.90 CERVICAL DISC DISEASE: ICD-10-CM

## 2024-01-03 DIAGNOSIS — R42 DIZZINESS: ICD-10-CM

## 2024-01-03 DIAGNOSIS — I10 PRIMARY HYPERTENSION: Primary | ICD-10-CM

## 2024-01-03 DIAGNOSIS — F32.A MILD DEPRESSION: ICD-10-CM

## 2024-01-03 DIAGNOSIS — F41.9 CHRONIC ANXIETY: ICD-10-CM

## 2024-01-03 DIAGNOSIS — D72.819 LEUKOPENIA, UNSPECIFIED TYPE: ICD-10-CM

## 2024-01-03 PROCEDURE — 99215 OFFICE O/P EST HI 40 MIN: CPT | Mod: PBBFAC | Performed by: FAMILY MEDICINE

## 2024-01-03 NOTE — PROGRESS NOTES
Subjective:       Patient ID: Jennie Julio is a 68 y.o. female.    Chief Complaint: Follow-up    HPI   Patient is a 68-year-old established patient with a history of chronic anxiety, onychomycosis, osteopenia, and HTN  who presents today for follow-up bout of severe dizziness. She was seen in the ED 12/19/2023 with acute onset of dizziness and incidental blood pressure elevation.  At the time she also had sinus pressure and runny nose.  CT head was negative for acute intracranial abnormality and she was treated by the ED with Antivert and a 7 day course of Augmentin for possible sinusitis.  An ECG done during that visit showed inferiorly changes which is different from her previous ECG of 07/24/2023.  She continues to deny chest pain, shortness for breath, palpitation and edema in extremities.  She reports that she took Antivert 1 to 2 times a day for several days but has not taken it in about a week.  She does admit to some stress today regarding her sister's recent hospitalization in a rehab hospital.  The rehab hospital is trying to discharge her from care to home and  and does not feel that she can take care of her in her current state.  Transient dizziness -probable vertigo improved/resolved  Hypertension today well-controlled at 120/76; patient is compliant with benazepril 20 mg, ASA 81 mg q.o.d. and atorvastatin 40 mg daily  Chronic anxiety and depression-  the patient is frustrated today by situation sister but overall well-controlled and without complaint    New complaint today:  Exacerbation of shoulder pain and pain in a patient with a history of cervical degenerative disc disease.  Patient does not want to pursue PT at this time due to issues with her sister discussed above.  She will consider at a later date, if symptoms are persistent, once her sister is out of the hospital.  - x-ray C-spine completed 07/25/2023 with mild degenerative cervical changes  - MRI C-spine completed 5 01/20/2019 demonstrating  "spondylosis at C4-5 and C5-6 with C5-6 neuroforaminal stenosis  Review of Systems    See HPI       Objective:      Vitals:    01/03/24 1016   BP: 120/76   Pulse: 76   Resp: 18   Temp: 98.4 °F (36.9 °C)   /76 (BP Location: Right arm, Patient Position: Sitting, BP Method: Medium (Automatic))   Pulse 76   Temp 98.4 °F (36.9 °C) (Oral)   Resp 18   Ht 5' 5" (1.651 m)   Wt 69.3 kg (152 lb 12.8 oz)   SpO2 99%   BMI 25.43 kg/m²       Physical Exam  Constitutional:       General: She is not in acute distress.     Appearance: She is not ill-appearing, toxic-appearing or diaphoretic.   Cardiovascular:      Rate and Rhythm: Normal rate and regular rhythm.   Pulmonary:      Effort: Pulmonary effort is normal. No respiratory distress.      Breath sounds: Normal breath sounds. No stridor. No wheezing, rhonchi or rales.   Neurological:      Mental Status: She is alert and oriented to person, place, and time.   Psychiatric:         Mood and Affect: Mood normal.         Behavior: Behavior normal.         Thought Content: Thought content normal.         Judgment: Judgment normal.           Assessment/Plan:  Primary hypertension   Well-controlled; continue current medication  Chronic anxiety  Mild depression   Well-controlled, continue current medication  Dizziness   Much improved; we will continue to observe  Cervical disc disease, osteoarthritis shoulders   Home exercise program; consider physical therapy when patient able  Leukopenia, unspecified type  -     CBC Auto Differential; Future; Expected date: 01/03/2024         Follow up in about 8 weeks (around 2/28/2024).          "

## 2024-01-05 RX ORDER — CLOBETASOL PROPIONATE 0.5 MG/G
OINTMENT TOPICAL
Qty: 30 G | Refills: 1 | Status: SHIPPED | OUTPATIENT
Start: 2024-01-05 | End: 2024-02-05

## 2024-01-26 ENCOUNTER — LAB VISIT (OUTPATIENT)
Dept: LAB | Facility: HOSPITAL | Age: 69
End: 2024-01-26
Attending: FAMILY MEDICINE
Payer: MEDICARE

## 2024-01-26 DIAGNOSIS — R30.0 DYSURIA: ICD-10-CM

## 2024-01-26 DIAGNOSIS — R30.0 DYSURIA: Primary | ICD-10-CM

## 2024-01-26 DIAGNOSIS — D72.819 LEUKOPENIA, UNSPECIFIED TYPE: ICD-10-CM

## 2024-01-26 DIAGNOSIS — N30.00 ACUTE CYSTITIS WITHOUT HEMATURIA: Primary | ICD-10-CM

## 2024-01-26 LAB
APPEARANCE UR: ABNORMAL
BACTERIA #/AREA URNS AUTO: ABNORMAL /HPF
BASOPHILS # BLD AUTO: 0.03 X10(3)/MCL
BASOPHILS NFR BLD AUTO: 0.5 %
BILIRUB UR QL STRIP.AUTO: NEGATIVE
COLOR UR AUTO: YELLOW
EOSINOPHIL # BLD AUTO: 0.2 X10(3)/MCL (ref 0–0.9)
EOSINOPHIL NFR BLD AUTO: 3.3 %
ERYTHROCYTE [DISTWIDTH] IN BLOOD BY AUTOMATED COUNT: 12.8 % (ref 11.5–17)
GLUCOSE UR QL STRIP.AUTO: NORMAL
HCT VFR BLD AUTO: 40 % (ref 37–47)
HGB BLD-MCNC: 13.1 G/DL (ref 12–16)
HYALINE CASTS #/AREA URNS LPF: ABNORMAL /LPF
IMM GRANULOCYTES # BLD AUTO: 0.01 X10(3)/MCL (ref 0–0.04)
IMM GRANULOCYTES NFR BLD AUTO: 0.2 %
KETONES UR QL STRIP.AUTO: NEGATIVE
LEUKOCYTE ESTERASE UR QL STRIP.AUTO: 500
LYMPHOCYTES # BLD AUTO: 1.84 X10(3)/MCL (ref 0.6–4.6)
LYMPHOCYTES NFR BLD AUTO: 30.4 %
MCH RBC QN AUTO: 29.3 PG (ref 27–31)
MCHC RBC AUTO-ENTMCNC: 32.8 G/DL (ref 33–36)
MCV RBC AUTO: 89.5 FL (ref 80–94)
MONOCYTES # BLD AUTO: 0.88 X10(3)/MCL (ref 0.1–1.3)
MONOCYTES NFR BLD AUTO: 14.5 %
MUCOUS THREADS URNS QL MICRO: ABNORMAL /LPF
NEUTROPHILS # BLD AUTO: 3.1 X10(3)/MCL (ref 2.1–9.2)
NEUTROPHILS NFR BLD AUTO: 51.1 %
NITRITE UR QL STRIP.AUTO: NEGATIVE
NRBC BLD AUTO-RTO: 0 %
PH UR STRIP.AUTO: 5.5 [PH]
PLATELET # BLD AUTO: 332 X10(3)/MCL (ref 130–400)
PLATELETS.RETICULATED NFR BLD AUTO: 1.5 % (ref 0.9–11.2)
PMV BLD AUTO: 9.3 FL (ref 7.4–10.4)
PROT UR QL STRIP.AUTO: NEGATIVE
RBC # BLD AUTO: 4.47 X10(6)/MCL (ref 4.2–5.4)
RBC #/AREA URNS AUTO: ABNORMAL /HPF
RBC UR QL AUTO: ABNORMAL
SP GR UR STRIP.AUTO: 1.01 (ref 1–1.03)
SQUAMOUS #/AREA URNS LPF: ABNORMAL /HPF
UROBILINOGEN UR STRIP-ACNC: NORMAL
WBC # SPEC AUTO: 6.06 X10(3)/MCL (ref 4.5–11.5)
WBC #/AREA URNS AUTO: >100 /HPF
WBC CLUMPS UR QL AUTO: ABNORMAL

## 2024-01-26 PROCEDURE — 87086 URINE CULTURE/COLONY COUNT: CPT

## 2024-01-26 PROCEDURE — 81001 URINALYSIS AUTO W/SCOPE: CPT

## 2024-01-26 PROCEDURE — 36415 COLL VENOUS BLD VENIPUNCTURE: CPT

## 2024-01-26 PROCEDURE — 85025 COMPLETE CBC W/AUTO DIFF WBC: CPT

## 2024-01-26 RX ORDER — CEPHALEXIN 500 MG/1
500 CAPSULE ORAL EVERY 8 HOURS
Qty: 21 CAPSULE | Refills: 0 | Status: SHIPPED | OUTPATIENT
Start: 2024-01-26 | End: 2024-02-05

## 2024-01-26 NOTE — PROGRESS NOTES
Patient had a UTI in June 2023 >100,000 colonies E coli  Resistant to ampicillin, Bactrim and nitrofurantoin.  She was treated with cephalexin with resolution of symptoms.    Today she reports recurrent dysuria and frequency  Urinalysis 1+ blood greater than 100 WBCs/high-power field moderate bacteria, 6-10 RBCs/high-power field  Will empirically treat with cephalexin 500 Q 8 x7 days while awaiting culture

## 2024-01-26 NOTE — PROGRESS NOTES
CBC white blood cell count 6.06 H&H 13.1/40.0 platelets 332  Urinalysis abnormal with moderate bacteria, greater than 100 WBCs per high-power field 6-10 RBCs per high-power field 1+ blood.  Urine culture pending.  Keflex 500 t.i.d. x7 days prescribed at patient's pharmacy of choice

## 2024-01-26 NOTE — PROGRESS NOTES
Patient reported increased urinary frequency which she treated with over-the-counter azo with some improvement.  She is requesting antibiotics for possible UTI.  Urinalysis and culture have been ordered

## 2024-01-28 LAB — BACTERIA UR CULT: ABNORMAL

## 2024-01-31 ENCOUNTER — OFFICE VISIT (OUTPATIENT)
Dept: FAMILY MEDICINE | Facility: CLINIC | Age: 69
End: 2024-01-31
Payer: MEDICARE

## 2024-01-31 ENCOUNTER — HOSPITAL ENCOUNTER (OUTPATIENT)
Dept: RADIOLOGY | Facility: HOSPITAL | Age: 69
Discharge: HOME | End: 2024-01-31
Attending: FAMILY MEDICINE
Payer: MEDICARE

## 2024-01-31 VITALS
BODY MASS INDEX: 25.83 KG/M2 | HEART RATE: 72 BPM | TEMPERATURE: 98 F | SYSTOLIC BLOOD PRESSURE: 130 MMHG | DIASTOLIC BLOOD PRESSURE: 77 MMHG | OXYGEN SATURATION: 100 % | HEIGHT: 65 IN | RESPIRATION RATE: 20 BRPM | WEIGHT: 155 LBS

## 2024-01-31 DIAGNOSIS — Z12.31 BREAST CANCER SCREENING BY MAMMOGRAM: ICD-10-CM

## 2024-01-31 DIAGNOSIS — L72.0 EPIDERMAL INCLUSION CYST: Primary | ICD-10-CM

## 2024-01-31 PROCEDURE — 77067 SCR MAMMO BI INCL CAD: CPT | Mod: 26,,, | Performed by: RADIOLOGY

## 2024-01-31 PROCEDURE — 77067 SCR MAMMO BI INCL CAD: CPT | Mod: TC

## 2024-01-31 PROCEDURE — 11421 EXC H-F-NK-SP B9+MARG 0.6-1: CPT | Mod: PBBFAC | Performed by: FAMILY MEDICINE

## 2024-01-31 PROCEDURE — 99214 OFFICE O/P EST MOD 30 MIN: CPT | Mod: PBBFAC,25

## 2024-01-31 PROCEDURE — 77063 BREAST TOMOSYNTHESIS BI: CPT | Mod: 26,,, | Performed by: RADIOLOGY

## 2024-01-31 RX ORDER — LIDOCAINE HYDROCHLORIDE 10 MG/ML
5 INJECTION, SOLUTION EPIDURAL; INFILTRATION; INTRACAUDAL; PERINEURAL
Status: COMPLETED | OUTPATIENT
Start: 2024-01-31 | End: 2024-01-31

## 2024-01-31 RX ADMIN — LIDOCAINE HYDROCHLORIDE 50 MG: 10 INJECTION, SOLUTION EPIDURAL; INFILTRATION; INTRACAUDAL; PERINEURAL at 09:01

## 2024-01-31 NOTE — PROGRESS NOTES
Subjective:       Patient ID: Jennie Julio is a 68 y.o. female.    Chief Complaint: Cyst (Posterior neck)    HPI  67 yo referred to minor surgery clinic for a cyst on the posterior aspect of her neck. She has had a cyst removed in the past from her neck. This one will fluctuate in size. No previous infection to the cyst. She desires removal.    Review of Systems  As per HPI         Objective:      Vitals:    01/31/24 0930   BP: 130/77   Pulse: 72   Resp: 20   Temp: 98 °F (36.7 °C)       Physical Exam    Gen: alert, no acute distress  Skin: posterior aspect of neck, left side- 1 cm cyst without erythema or draiange  Psych: cooperative, appropriate mood and affect    Procedure Note:  Procedure: epidermal inclusion cyst excision  Performed by: Navya Matta MD  Consent: signed consent obtained after discussion of risks, benefits, and alternative treatments. Time out completed  Description: Area cleaned with alcohol. 1% lidocaine used for anesthesia. The area was cleaned with chloraprep. A 4mm circular blade was used to make an incision over the cyst. Cyst wall and contents were removed with curved iris scissors and pickups. Gel foam placed into opening for hemostasis. Blood loss was <5cc.  The patient tolerated the procedure well with no complications.     Assessment/Plan:  Epidermal inclusion cyst    Other orders  -     LIDOcaine (PF) 10 mg/ml (1%) injection 50 mg    - excision of cyst today  - Post care instructions and return precautions discussed.        Follow up if symptoms worsen or fail to improve.

## 2024-02-01 ENCOUNTER — HOSPITAL ENCOUNTER (EMERGENCY)
Facility: HOSPITAL | Age: 69
Discharge: HOME | End: 2024-02-01
Attending: INTERNAL MEDICINE
Payer: MEDICARE

## 2024-02-01 VITALS
DIASTOLIC BLOOD PRESSURE: 99 MMHG | BODY MASS INDEX: 25.56 KG/M2 | TEMPERATURE: 98 F | SYSTOLIC BLOOD PRESSURE: 174 MMHG | HEIGHT: 65 IN | OXYGEN SATURATION: 100 % | RESPIRATION RATE: 18 BRPM | HEART RATE: 66 BPM | WEIGHT: 153.44 LBS

## 2024-02-01 DIAGNOSIS — I10 UNCONTROLLED HYPERTENSION: Primary | ICD-10-CM

## 2024-02-01 DIAGNOSIS — R10.9 ABDOMINAL PAIN, LEFT LATERAL: ICD-10-CM

## 2024-02-01 LAB
ALBUMIN SERPL-MCNC: 3.7 G/DL (ref 3.4–4.8)
ALBUMIN/GLOB SERPL: 1.1 RATIO (ref 1.1–2)
ALP SERPL-CCNC: 69 UNIT/L (ref 40–150)
ALT SERPL-CCNC: 11 UNIT/L (ref 0–55)
APPEARANCE UR: CLEAR
AST SERPL-CCNC: 16 UNIT/L (ref 5–34)
BACTERIA #/AREA URNS AUTO: ABNORMAL /HPF
BASOPHILS # BLD AUTO: 0.03 X10(3)/MCL
BASOPHILS NFR BLD AUTO: 0.6 %
BILIRUB SERPL-MCNC: 0.3 MG/DL
BILIRUB UR QL STRIP.AUTO: NEGATIVE
BUN SERPL-MCNC: 14.1 MG/DL (ref 9.8–20.1)
CALCIUM SERPL-MCNC: 9.2 MG/DL (ref 8.4–10.2)
CHLORIDE SERPL-SCNC: 108 MMOL/L (ref 98–107)
CO2 SERPL-SCNC: 27 MMOL/L (ref 23–31)
COLOR UR AUTO: COLORLESS
CREAT SERPL-MCNC: 0.7 MG/DL (ref 0.55–1.02)
EOSINOPHIL # BLD AUTO: 0.17 X10(3)/MCL (ref 0–0.9)
EOSINOPHIL NFR BLD AUTO: 3.5 %
ERYTHROCYTE [DISTWIDTH] IN BLOOD BY AUTOMATED COUNT: 12.8 % (ref 11.5–17)
FLUAV AG UPPER RESP QL IA.RAPID: NOT DETECTED
FLUBV AG UPPER RESP QL IA.RAPID: NOT DETECTED
GFR SERPLBLD CREATININE-BSD FMLA CKD-EPI: >60 MLS/MIN/1.73/M2
GLOBULIN SER-MCNC: 3.3 GM/DL (ref 2.4–3.5)
GLUCOSE SERPL-MCNC: 83 MG/DL (ref 82–115)
GLUCOSE UR QL STRIP.AUTO: NORMAL
HCT VFR BLD AUTO: 38.9 % (ref 37–47)
HGB BLD-MCNC: 13.3 G/DL (ref 12–16)
HOLD SPECIMEN: NORMAL
HYALINE CASTS #/AREA URNS LPF: ABNORMAL /LPF
IMM GRANULOCYTES # BLD AUTO: 0.01 X10(3)/MCL (ref 0–0.04)
IMM GRANULOCYTES NFR BLD AUTO: 0.2 %
KETONES UR QL STRIP.AUTO: NEGATIVE
LEUKOCYTE ESTERASE UR QL STRIP.AUTO: NEGATIVE
LYMPHOCYTES # BLD AUTO: 1.86 X10(3)/MCL (ref 0.6–4.6)
LYMPHOCYTES NFR BLD AUTO: 38 %
MCH RBC QN AUTO: 30.6 PG (ref 27–31)
MCHC RBC AUTO-ENTMCNC: 34.2 G/DL (ref 33–36)
MCV RBC AUTO: 89.6 FL (ref 80–94)
MONOCYTES # BLD AUTO: 0.6 X10(3)/MCL (ref 0.1–1.3)
MONOCYTES NFR BLD AUTO: 12.3 %
NEUTROPHILS # BLD AUTO: 2.22 X10(3)/MCL (ref 2.1–9.2)
NEUTROPHILS NFR BLD AUTO: 45.4 %
NITRITE UR QL STRIP.AUTO: NEGATIVE
NRBC BLD AUTO-RTO: 0 %
PH UR STRIP.AUTO: 6 [PH]
PLATELET # BLD AUTO: 335 X10(3)/MCL (ref 130–400)
PMV BLD AUTO: 9.2 FL (ref 7.4–10.4)
POTASSIUM SERPL-SCNC: 4 MMOL/L (ref 3.5–5.1)
PROT SERPL-MCNC: 7 GM/DL (ref 5.8–7.6)
PROT UR QL STRIP.AUTO: NEGATIVE
RBC # BLD AUTO: 4.34 X10(6)/MCL (ref 4.2–5.4)
RBC #/AREA URNS AUTO: ABNORMAL /HPF
RBC UR QL AUTO: NEGATIVE
RSV A 5' UTR RNA NPH QL NAA+PROBE: NOT DETECTED
SARS-COV-2 RNA RESP QL NAA+PROBE: NOT DETECTED
SODIUM SERPL-SCNC: 141 MMOL/L (ref 136–145)
SP GR UR STRIP.AUTO: 1.01 (ref 1–1.03)
SQUAMOUS #/AREA URNS LPF: ABNORMAL /HPF
UROBILINOGEN UR STRIP-ACNC: NORMAL
WBC # SPEC AUTO: 4.89 X10(3)/MCL (ref 4.5–11.5)
WBC #/AREA URNS AUTO: ABNORMAL /HPF

## 2024-02-01 PROCEDURE — 81001 URINALYSIS AUTO W/SCOPE: CPT | Performed by: INTERNAL MEDICINE

## 2024-02-01 PROCEDURE — 99283 EMERGENCY DEPT VISIT LOW MDM: CPT

## 2024-02-01 PROCEDURE — 80053 COMPREHEN METABOLIC PANEL: CPT | Performed by: INTERNAL MEDICINE

## 2024-02-01 PROCEDURE — 85025 COMPLETE CBC W/AUTO DIFF WBC: CPT | Performed by: INTERNAL MEDICINE

## 2024-02-01 PROCEDURE — 0241U COVID/RSV/FLU A&B PCR: CPT | Performed by: INTERNAL MEDICINE

## 2024-02-02 NOTE — ED PROVIDER NOTES
Encounter Date: 2/1/2024       History     Chief Complaint   Patient presents with    Abdominal Pain    Nausea     X1.5 days, started taking keflex and symptoms started after taking half the course     Presents with pelvic pain and left flank pain associated to chills for the last few days. Is taking cephalexin for a UTI and sinus infection (6 caps in the bottle from 21). States called her PCP who recommends ED visits for test    The history is provided by the patient.     Review of patient's allergies indicates:   Allergen Reactions    Sulfa (sulfonamide antibiotics) Nausea And Vomiting     Other reaction(s): Nausea, vomiting and diarrhea     Past Medical History:   Diagnosis Date    Chronic anxiety 5/16/2022    Dystrophic nail 12/13/2022    History of pneumonia 12/13/2022    Low back pain with sciatica 5/16/2022    Mild depression 5/16/2022    Mitral valve prolapse 5/16/2022    Mixed hyperlipidemia 5/16/2022    Osteoarthritis of ankle 5/16/2022    Persistent cough 1/30/2023    Personal history of colonic polyps 06/10/2013    Primary hypertension 5/16/2022    Varicose veins of lower extremity 5/16/2022     Past Surgical History:   Procedure Laterality Date    COLONOSCOPY  06/10/2013    COLONOSCOPY  05/15/2019     History reviewed. No pertinent family history.  Social History     Tobacco Use    Smoking status: Never    Smokeless tobacco: Never   Substance Use Topics    Alcohol use: Not Currently    Drug use: Never     Review of Systems   Constitutional:  Positive for chills. Negative for fever.   HENT:  Negative for sore throat.    Respiratory:  Positive for shortness of breath.    Cardiovascular:  Negative for chest pain.   Gastrointestinal:  Negative for nausea.   Genitourinary:  Positive for flank pain and pelvic pain. Negative for dysuria.   Musculoskeletal:  Negative for back pain.   Skin:  Negative for rash.   Neurological:  Negative for weakness.   Hematological:  Does not bruise/bleed easily.   All other  systems reviewed and are negative.      Physical Exam     Initial Vitals [02/01/24 2055]   BP Pulse Resp Temp SpO2   (!) 172/89 70 18 97.9 °F (36.6 °C) 100 %      MAP       --         Physical Exam    Nursing note and vitals reviewed.  Constitutional: She appears well-developed and well-nourished. No distress.   HENT:   Head: Normocephalic and atraumatic.   Mouth/Throat: Oropharynx is clear and moist.   Eyes: Conjunctivae are normal. Pupils are equal, round, and reactive to light.   Neck: Neck supple.   Normal range of motion.  Cardiovascular:  Normal rate, regular rhythm, normal heart sounds and intact distal pulses.           Pulmonary/Chest: Breath sounds normal.   Abdominal: Abdomen is soft. Bowel sounds are normal. She exhibits no distension. There is no abdominal tenderness. There is no rebound and no guarding.   Musculoskeletal:         General: No edema. Normal range of motion.      Cervical back: Normal range of motion and neck supple.     Neurological: She is alert and oriented to person, place, and time. She has normal strength. GCS score is 15. GCS eye subscore is 4. GCS verbal subscore is 5. GCS motor subscore is 6.   Skin: Skin is warm and dry. No rash noted.   Small skin surgical lesion on posterior neck w/o signs of infection   Psychiatric: Her behavior is normal.         ED Course   Procedures  Labs Reviewed   COMPREHENSIVE METABOLIC PANEL - Abnormal; Notable for the following components:       Result Value    Chloride 108 (*)     All other components within normal limits   URINALYSIS, REFLEX TO URINE CULTURE - Abnormal; Notable for the following components:    Color, UA Colorless (*)     All other components within normal limits   COVID/RSV/FLU A&B PCR - Normal    Narrative:     The Xpert Xpress SARS-CoV-2/FLU/RSV plus is a rapid, multiplexed real-time PCR test intended for the simultaneous qualitative detection and differentiation of SARS-CoV-2, Influenza A, Influenza B, and respiratory syncytial  virus (RSV) viral RNA in either nasopharyngeal swab or nasal swab specimens.         CBC W/ AUTO DIFFERENTIAL    Narrative:     The following orders were created for panel order CBC auto differential.  Procedure                               Abnormality         Status                     ---------                               -----------         ------                     CBC with Differential[[id number]]                           Final result                 Please view results for these tests on the individual orders.   CBC WITH DIFFERENTIAL   EXTRA TUBES    Narrative:     The following orders were created for panel order EXTRA TUBES.  Procedure                               Abnormality         Status                     ---------                               -----------         ------                     Light Blue Top Hold[[id number]]                             In process                 Light Green Top Hold[[id number]]                            In process                 Gold Top Hold[[id number]]                                   In process                   Please view results for these tests on the individual orders.   LIGHT BLUE TOP HOLD   LIGHT GREEN TOP HOLD   GOLD TOP HOLD          Imaging Results    None          Medications - No data to display  Medical Decision Making  Amount and/or Complexity of Data Reviewed  Labs: ordered. Decision-making details documented in ED Course.      Additional MDM:   Differential Diagnosis:   Kidney stone, renal colic, pyelonephritis, aortic aneurysm, aortic dissection, musculoskeletal pain, renal failure, renal malignancy among others                                      Clinical Impression:  Final diagnoses:  [I10] Uncontrolled hypertension (Primary)  [R10.9] Abdominal pain, left lateral          ED Disposition Condition    Discharge Stable          ED Prescriptions    None       Follow-up Information       Follow up With Specialties Details Why Contact Lynn Montano  Soni VICENTE MD Family Medicine In 1 week  [address] [facility] [address]  [phone]      [facility] Emergency Medicine  If symptoms worsen [address] [facility] [id number] [phone]             Gary Savage MD  02/01/24 1984

## 2024-02-05 ENCOUNTER — OFFICE VISIT (OUTPATIENT)
Dept: FAMILY MEDICINE | Facility: CLINIC | Age: 69
End: 2024-02-05
Payer: MEDICARE

## 2024-02-05 VITALS
BODY MASS INDEX: 25.89 KG/M2 | WEIGHT: 155.38 LBS | HEIGHT: 65 IN | TEMPERATURE: 97 F | SYSTOLIC BLOOD PRESSURE: 115 MMHG | OXYGEN SATURATION: 100 % | HEART RATE: 78 BPM | DIASTOLIC BLOOD PRESSURE: 82 MMHG

## 2024-02-05 DIAGNOSIS — J30.9 ALLERGIC RHINITIS, UNSPECIFIED SEASONALITY, UNSPECIFIED TRIGGER: ICD-10-CM

## 2024-02-05 DIAGNOSIS — E78.2 MIXED HYPERLIPIDEMIA: ICD-10-CM

## 2024-02-05 DIAGNOSIS — I10 PRIMARY HYPERTENSION: ICD-10-CM

## 2024-02-05 DIAGNOSIS — F41.9 CHRONIC ANXIETY: ICD-10-CM

## 2024-02-05 PROCEDURE — 99213 OFFICE O/P EST LOW 20 MIN: CPT | Mod: PBBFAC

## 2024-02-05 RX ORDER — FLUTICASONE PROPIONATE 50 MCG
2 SPRAY, SUSPENSION (ML) NASAL DAILY
Qty: 15.6 ML | Refills: 11 | Status: SHIPPED | OUTPATIENT
Start: 2024-02-05

## 2024-02-05 RX ORDER — ASPIRIN 81 MG/1
81 TABLET ORAL EVERY OTHER DAY
Qty: 90 TABLET | Refills: 1 | Status: SHIPPED | OUTPATIENT
Start: 2024-02-05

## 2024-02-05 RX ORDER — VENLAFAXINE HYDROCHLORIDE 75 MG/1
75 CAPSULE, EXTENDED RELEASE ORAL DAILY
Qty: 90 CAPSULE | Refills: 1 | Status: SHIPPED | OUTPATIENT
Start: 2024-02-05 | End: 2024-05-20 | Stop reason: SDUPTHER

## 2024-02-05 RX ORDER — ATORVASTATIN CALCIUM 40 MG/1
40 TABLET, FILM COATED ORAL DAILY
Qty: 90 TABLET | Refills: 3 | Status: SHIPPED | OUTPATIENT
Start: 2024-02-05

## 2024-02-05 RX ORDER — GABAPENTIN 100 MG/1
CAPSULE ORAL
Qty: 150 CAPSULE | Refills: 3 | Status: SHIPPED | OUTPATIENT
Start: 2024-02-05

## 2024-02-05 RX ORDER — BENAZEPRIL HYDROCHLORIDE 20 MG/1
20 TABLET ORAL DAILY
Qty: 90 TABLET | Refills: 3 | Status: SHIPPED | OUTPATIENT
Start: 2024-02-05 | End: 2024-06-19 | Stop reason: SDUPTHER

## 2024-02-05 NOTE — PROGRESS NOTES
[facility] Clinic Note    CHIEF COMPLAINT:  Chief Complaint   Patient presents with    Hypertension    Food insecurity Absent         HISTORY OF  PRESENT ILLNESS:  Jennie Julio is a 68 y.o. female w/PMHx of HTN, HLD, vertigo, anxiety, osteopenia, onychomycosis, who presents to clinic today for f/u after removal of inclusion cyst     Acute issues:  1) S/p removal of epidural inclusion cyst   Was seen in minor surgery clinic on 01/31/24  Cyst on the posterior neck was removed at the time   Patient currently has no complaints and states she has been keeping area clean and dry  Only has been applying OTC topical antibacterial cream in affected area with band-aid over it  Denies drainage from wound site, tenderness over area    2) Dysuria  Was treated with cephalexin 500 TID  x7 days starting on 01/26/24 due to UA on 01/26/24 showing 1+ blood greater than 100 WBCs/high-power field moderate bacteria, 6-10 RBCs/high-power field   After taking day 5/7 of Keflex, patient reported to having symptoms of nausea, lightheadedness, elevated BP.   Patient contacted Pcp about symptoms and was told to go to ED for further evaluation/treatment  She had ED visit on 02/01/24   CMP, CBC, UA, COVID/Flu/RSV were all non-significant   Patient was ultimately discharged without any intervention in the ED    Chronic issues:  1) HTN  Currently taking benazepril 20 mg qd   BP ranging at 110-115/70s at home   Currently has no complaints other than mild weight gain in last few wks which she attributes to eating too much radha cake   Denies lightheadedness, HA, chest pain, palpitations, shortness of breath, nausea/vomiting      REVIEW OF SYSTEMS:  See HPI      Health Maintenance:  Colon Cancer screening: due in 05/05/2024  Breast cancer screening: Mammogram 02/01/2024 - BI-RADS 1 bilaterally; repeat in 1 year  Cervical Cancer Screen: 09/28/2021   Immunizations: UTD      PMHx:  Chronic anxiety/depression  - Effexor-XR 75 mg qd; refilled  HLD - Lipitor 40  "mg qd; refilled  HTN - benazepril 20 mg qd; refilled  Allergic rhinitis - Flonase; refilled  Chronic Lower back w/sciatica - gabapentin 100 mg qd; refilled    Past Medical History:   Diagnosis Date    Chronic anxiety 5/16/2022    Dystrophic nail 12/13/2022    History of pneumonia 12/13/2022    Low back pain with sciatica 5/16/2022    Mild depression 5/16/2022    Mitral valve prolapse 5/16/2022    Mixed hyperlipidemia 5/16/2022    Osteoarthritis of ankle 5/16/2022    Persistent cough 1/30/2023    Personal history of colonic polyps 06/10/2013    Primary hypertension 5/16/2022    Varicose veins of lower extremity 5/16/2022      Past Surgical History:   Procedure Laterality Date    COLONOSCOPY  06/10/2013    COLONOSCOPY  05/15/2019      Social History     Socioeconomic History    Marital status:    Tobacco Use    Smoking status: Never    Smokeless tobacco: Never   Substance and Sexual Activity    Alcohol use: Not Currently    Drug use: Never    Sexual activity: Yes     Partners: Male     Birth control/protection: None     Social Determinants of Health     Food Insecurity: No Food Insecurity (2/5/2024)    Hunger Vital Sign     Worried About Running Out of Food in the Last Year: Never true     Ran Out of Food in the Last Year: Never true         Review of Most Recent Wound Care-Related Labs:  Lab Results   Component Value Date/Time    WBC 4.89 02/01/2024 09:18 PM    RBC 4.34 02/01/2024 09:18 PM    HGB 13.3 02/01/2024 09:18 PM    HCT 38.9 02/01/2024 09:18 PM    MCV 89.6 02/01/2024 09:18 PM    MCH 30.6 02/01/2024 09:18 PM    CREATININE 0.70 02/01/2024 09:18 PM    HGBA1C 5.3 02/18/2022 09:00 AM        PHYSICAL EXAMINATION:  Blood pressure 115/82, pulse 78, temperature 97.3 °F (36.3 °C), temperature source Oral, height 5' 5" (1.651 m), weight 70.5 kg (155 lb 6.4 oz), SpO2 100 %.    General:  VSS. No acute distress.   Respiratory: clear to ascultation in all lung fields  Cardiovascular:  RRR, no additional heart sounds " heard.  Skin: 1 cm punched lesion located in posterior neck; well-healed without scar        ASSESSMENT/PLAN:    1) S/p removal of epidural inclusion cyst   Resolved    2) UTI  Resolved based on previous UA and currently no symptoms     3) HTN  BP in clinic 115/82 at baseline  Refilled antihypertensive medication     - RTC in 02/28/24 with PCP     Blayne Macedo MD   [facility] Family Medicine Resident HO-1  02/05/2024

## 2024-02-08 NOTE — PROGRESS NOTES
Faculty Attestation: Patient was seen in [facility] Family Medicine clinic. Patient was seen and examined by me at the time of the visit. I have personally reviewed History of the Present Illness , Review of Systems, PFSH , Physical Exam, Assessment and Plan documented by the resident. I participated in the management of the patient and was immediately available throughout the encounter. Importance of adherence to treatment recommendations discussed with patient at the time of the visit. Services were furnished in a primary care center in the outpatient department at a teaching hospital. I discussed the patient with the resident and agree with resident's findings and plan as documented in the resident note. Soni Smart MD

## 2024-02-28 ENCOUNTER — OFFICE VISIT (OUTPATIENT)
Dept: FAMILY MEDICINE | Facility: CLINIC | Age: 69
End: 2024-02-28
Payer: MEDICARE

## 2024-02-28 VITALS
TEMPERATURE: 99 F | HEIGHT: 65 IN | DIASTOLIC BLOOD PRESSURE: 78 MMHG | HEART RATE: 72 BPM | BODY MASS INDEX: 25.69 KG/M2 | SYSTOLIC BLOOD PRESSURE: 131 MMHG | OXYGEN SATURATION: 99 % | RESPIRATION RATE: 18 BRPM | WEIGHT: 154.19 LBS

## 2024-02-28 DIAGNOSIS — E78.2 MIXED HYPERLIPIDEMIA: ICD-10-CM

## 2024-02-28 DIAGNOSIS — R05.3 PERSISTENT COUGH FOR 3 WEEKS OR LONGER: ICD-10-CM

## 2024-02-28 DIAGNOSIS — F41.9 CHRONIC ANXIETY: ICD-10-CM

## 2024-02-28 DIAGNOSIS — I10 PRIMARY HYPERTENSION: ICD-10-CM

## 2024-02-28 DIAGNOSIS — F32.A MILD DEPRESSION: ICD-10-CM

## 2024-02-28 DIAGNOSIS — J06.9 UPPER RESPIRATORY TRACT INFECTION, UNSPECIFIED TYPE: Primary | ICD-10-CM

## 2024-02-28 LAB
FLUAV AG UPPER RESP QL IA.RAPID: NOT DETECTED
FLUBV AG UPPER RESP QL IA.RAPID: NOT DETECTED
RSV A 5' UTR RNA NPH QL NAA+PROBE: NOT DETECTED
SARS-COV-2 RNA RESP QL NAA+PROBE: NOT DETECTED

## 2024-02-28 PROCEDURE — 99214 OFFICE O/P EST MOD 30 MIN: CPT | Mod: PBBFAC | Performed by: FAMILY MEDICINE

## 2024-02-28 PROCEDURE — 0241U COVID/RSV/FLU A&B PCR: CPT | Performed by: FAMILY MEDICINE

## 2024-02-28 NOTE — LETTER
February 28, 2024      [facility] [address] [place] [address]  Phone: [phone]       Patient: Jennie Julio   YOB: 1955  Date of Visit: 02/28/2024    To Whom It May Concern:    Genesis Julio  was at [facility] on 02/28/2024. Please excuse absence from work today through 3/2/2024.  The patient may return to work/school  March 3, 2024. If you have any questions or concerns, or if I can be of further assistance, please do not hesitate to contact me.    Sincerely,    Soni Montano MD   
  February 28, 2024      [facility] [address] [place] [address]  Phone: [phone]       Patient: Jennie Julio   YOB: 1955  Date of Visit: 02/28/2024    To Whom It May Concern:    Genesis Julio  was at [facility] on 02/28/2024. The patient may return to work on 02/29/2024 with no restrictions. If you have any questions or concerns, or if I can be of further assistance, please do not hesitate to contact me.    Sincerely,    Alia Ferrell LPN     
yes

## 2024-02-28 NOTE — PROGRESS NOTES
Subjective:       Patient ID: Jennie Julio is a 68 y.o. female.    Chief Complaint: Follow-up (Multiple medical conditions) and Chest Congestion (Started last Friday. Pt stated also has sinus congestion, cough and fatigue. )    HPI   And presents today for follow-up of her chronic medical conditions.  She reports new onset of upper respiratory symptoms beginning late last week.  Notably she had a  sore throat x 1 day then developed runny nose then progressed to nasal congestion   now complains of chest congestion and a nonproductive cough with continued sinus pressure.  She also reports she tires easily.  She reports she stopped using Flonase daily because she was beginning to get to dry and having nosebleeds.  She denies any fever, chills, nausea, vomiting, diarrhea.  Patient notably took bear 500 mg 1 a day and 1 day to twice for symptoms upper respiratory infection.    PMH  Hypertension well-controlled  Chronic in anxiety and depression-well-controlled  Osteopenia-last BMD 01/18/2023 will repeat 01/2025  Onychomycosis and tinea pedis-improved/resolved  Lumbar degenerative disc disease with sciatica  Mixed hyperlipidemia  Osteoarthritis ankle  Meds  Aspirin 81 mg daily  Lipitor 40 mg daily  Lisinopril 20 mg daily  Flonase-currently on hold  Gabapentin 100 mg q.a.m. and 300 mg HS  Alive Women's 50+ gummies  Effexor XR 75 mg daily  Review of Systems   HENT:  Positive for nosebleeds, rhinorrhea, sinus pressure/congestion, sneezing and sore throat.    Respiratory:  Positive for cough. Negative for shortness of breath.    Cardiovascular:  Negative for chest pain and leg swelling.   Gastrointestinal:  Negative for abdominal pain, diarrhea, nausea and vomiting.   Neurological:  Negative for headaches.          Objective:      Vitals:    02/28/24 1041   BP: 131/78   Pulse: 72   Resp: 18   Temp: 98.6 °F (37 °C)   /78 (BP Location: Right arm, Patient Position: Sitting, BP Method: Medium (Automatic))   Pulse 72   Temp  "98.6 °F (37 °C) (Oral)   Resp 18   Ht 5' 5" (1.651 m)   Wt 69.9 kg (154 lb 3.2 oz)   SpO2 99%   BMI 25.66 kg/m²       Physical Exam  Constitutional:       General: She is not in acute distress.     Appearance: She is not ill-appearing, toxic-appearing or diaphoretic.   Cardiovascular:      Rate and Rhythm: Normal rate and regular rhythm.      Heart sounds: Murmur (Previously established and evaluated with echo-grade 3 holosystolic ejection murmur) heard.      No friction rub. No gallop.   Neurological:      Mental Status: She is alert and oriented to person, place, and time.   Psychiatric:         Behavior: Behavior is cooperative.         Thought Content: Thought content is not paranoid or delusional. Thought content does not include homicidal or suicidal ideation.      Comments: Patient is happy and smiling           Assessment/Plan:  Upper respiratory tract infection, unspecified type  Viral URI versus developing bacterial sinusitis secondary to upper respiratory infection greater than 1 week's duration  As patient's symptoms are progressing, we will consider antibiotic therapy if symptoms do not improve in the next few days.  Encourage resumption of Tylenol p.r.n. for headache achiness and resumption of 81 mg aspirin  Patient to call if symptoms do not improve  -     COVID/RSV/FLU A&B PCR-all not detected  Primary hypertension  Hypertension well-controlled  Need to obtain records from Dr. Combs for last echocardiogram  Previously documented aortic valve calcification  Recommend resumption of 81 mg aspirin daily   Mixed hyperlipidemia  LDL 84 HDL 65 at last assessment 10/25/23  Chronic anxiety well-controlled  Mild depression well-controlled  Continue Effexor XR 75 mg daily     Follow up in about 8 weeks (around 4/24/2024).          "

## 2024-02-29 DIAGNOSIS — J01.90 ACUTE SINUSITIS WITH SYMPTOMS GREATER THAN 10 DAYS: Primary | ICD-10-CM

## 2024-02-29 RX ORDER — DOXYCYCLINE 100 MG/1
100 CAPSULE ORAL EVERY 12 HOURS
Qty: 20 CAPSULE | Refills: 0 | Status: SHIPPED | OUTPATIENT
Start: 2024-02-29 | End: 2024-03-10

## 2024-03-01 NOTE — PROGRESS NOTES
Patient has worsening sinus pain, congestion , purulent discharge and has had symptoms for 7-10 days . I will call out Doxycyline 100 mg BID x 10 days and she is to pick it up if she is not better.

## 2024-05-20 ENCOUNTER — OFFICE VISIT (OUTPATIENT)
Dept: FAMILY MEDICINE | Facility: CLINIC | Age: 69
End: 2024-05-20
Payer: MEDICARE

## 2024-05-20 ENCOUNTER — HOSPITAL ENCOUNTER (OUTPATIENT)
Dept: RADIOLOGY | Facility: HOSPITAL | Age: 69
Discharge: HOME | End: 2024-05-20
Attending: FAMILY MEDICINE
Payer: MEDICARE

## 2024-05-20 VITALS
SYSTOLIC BLOOD PRESSURE: 107 MMHG | OXYGEN SATURATION: 99 % | BODY MASS INDEX: 25.78 KG/M2 | TEMPERATURE: 98 F | WEIGHT: 154.75 LBS | HEIGHT: 65 IN | HEART RATE: 78 BPM | DIASTOLIC BLOOD PRESSURE: 69 MMHG | RESPIRATION RATE: 18 BRPM

## 2024-05-20 DIAGNOSIS — K59.00 CONSTIPATION, UNSPECIFIED CONSTIPATION TYPE: ICD-10-CM

## 2024-05-20 DIAGNOSIS — I10 PRIMARY HYPERTENSION: ICD-10-CM

## 2024-05-20 DIAGNOSIS — F41.9 CHRONIC ANXIETY: Primary | ICD-10-CM

## 2024-05-20 DIAGNOSIS — F32.A MILD DEPRESSION: ICD-10-CM

## 2024-05-20 DIAGNOSIS — M54.2 NECK PAIN ON RIGHT SIDE: ICD-10-CM

## 2024-05-20 DIAGNOSIS — M54.9 MID BACK PAIN: ICD-10-CM

## 2024-05-20 DIAGNOSIS — K63.5 POLYP OF COLON, UNSPECIFIED PART OF COLON, UNSPECIFIED TYPE: ICD-10-CM

## 2024-05-20 PROCEDURE — 72070 X-RAY EXAM THORAC SPINE 2VWS: CPT | Mod: TC

## 2024-05-20 PROCEDURE — 99215 OFFICE O/P EST HI 40 MIN: CPT | Mod: PBBFAC,25 | Performed by: FAMILY MEDICINE

## 2024-05-20 PROCEDURE — 72040 X-RAY EXAM NECK SPINE 2-3 VW: CPT | Mod: TC

## 2024-05-20 RX ORDER — VENLAFAXINE HYDROCHLORIDE 75 MG/1
150 CAPSULE, EXTENDED RELEASE ORAL DAILY
Qty: 60 CAPSULE | Refills: 3 | Status: SHIPPED | OUTPATIENT
Start: 2024-05-20 | End: 2024-06-19 | Stop reason: SDUPTHER

## 2024-05-20 NOTE — PROGRESS NOTES
Subjective:       Patient ID: Jennie Julio is a 68 y.o. female.    Chief Complaint: Follow-up and Hypertension (Multiple medical conditions)    HPI  Jennie is an established 68-year-old patient who presents today for follow-up of hypertension, chronic anxiety and depression, lumbar degenerative disc disease with sciatica.  Jennie has a new/worsening complaint of neck pain/upper back pain for several months.  Her boyfriend has been able to massage it with some benefit.  She would love to go to physical therapy but has been unable to due to the demands of her job.  It was worse with stress She has been also under a great deal of stress because her dog having seizures, her sister having health problems, and a stressful work environment.  She is adherent to the daily use of her Effexor XR 75 and occasionally takes 2 capsules on 1 day.  Her blood pressure has been fluctuating and has been elevated on occasions when she has been very anxious.  She also reports intermittent headaches and palpitations which she believes may be complicated by anxiety.  She has previously been evaluated by Cardiology.  She recently had an ETT but I do not have a copy of the results.  Prior workup (02/09/2023)   Carotid ultrasound with mild disease bilaterally  TTE EF 65% with normal left ventricular systolic function/grade 1 diastolic function  Mild tricuspid regurgitation  Holter monitor 12/30/2022  -predominant rhythm sinus with heart rate  and average of 90 bpm.  -no ventricular tachycardia or SVT  -rare PVCs (total 93, average 1.94 per hour)  -rare PACs (total 90, average 1.88 per hour)  PMH  Hypertension well-controlled  Chronic in anxiety and depression-well-controlled  Osteopenia-last BMD 01/18/2023 will repeat 01/2025  Onychomycosis and tinea pedis-improved/resolved  Lumbar degenerative disc disease with sciatica  Mixed hyperlipidemia  Osteoarthritis ankle  MEDS  Aspirin 81 mg daily  Lipitor 40 mg daily  Lisinopril 20 mg  "daily  Flonase-currently on hold  Gabapentin 100 mg q.a.m. and 300 mg HS  Alive Women's 50+ gummies  Effexor XR 75 mg daily     Health Maintenance:  Colon Cancer screening: due in 05/05/2024-patient has not been contacted  Breast cancer screening: Mammogram 02/01/2024 - BI-RADS 1 bilaterally; repeat in 1 year  Cervical Cancer Screen: 09/28/2021 ; repeat September 2024  Immunizations: UTD       Review of Systems   Constitutional:  Positive for activity change and unexpected weight change.   HENT:  Positive for hearing loss. Negative for rhinorrhea and trouble swallowing.    Eyes:  Negative for discharge and visual disturbance.   Respiratory:  Negative for chest tightness and wheezing.    Cardiovascular:  Positive for palpitations. Negative for chest pain.   Gastrointestinal:  Positive for constipation. Negative for blood in stool, diarrhea and vomiting.   Endocrine: Negative for polydipsia and polyuria.   Genitourinary:  Negative for difficulty urinating, dysuria, hematuria and menstrual problem.   Musculoskeletal:  Positive for arthralgias and neck pain. Negative for joint swelling.   Neurological:  Positive for headaches. Negative for weakness.   Psychiatric/Behavioral:  Negative for confusion and dysphoric mood.             Objective:      Vitals:    05/20/24 1335   BP: 107/69   Pulse: 78   Resp: 18   Temp: 98.1 °F (36.7 °C)   /69 (BP Location: Right arm, Patient Position: Sitting, BP Method: Medium (Automatic))   Pulse 78   Temp 98.1 °F (36.7 °C) (Oral)   Resp 18   Ht 5' 5" (1.651 m)   Wt 70.2 kg (154 lb 12.2 oz)   SpO2 99%   BMI 25.75 kg/m²       Physical Exam  Constitutional:       General: She is not in acute distress.     Appearance: She is not ill-appearing, toxic-appearing or diaphoretic.   Neurological:      Mental Status: She is alert and oriented to person, place, and time.   Psychiatric:         Mood and Affect: Mood is anxious. Mood is not depressed or elated. Affect is not flat or " inappropriate.         Behavior: Behavior is cooperative.         Thought Content: Thought content is not paranoid or delusional. Thought content does not include homicidal or suicidal ideation. Thought content does not include homicidal or suicidal plan.         PHQ-9 score 6 JIMENA-7 score 11  No SI/HI    Assessment/Plan:  Chronic anxiety  Mild depression  Due to worsening anxiety and meant of mild depression, I recommend increasing venlafaxine to 150 mg daily  Close follow-up for reassessment  Patient has my number and may contact me at any time  I recommend a brief leave of absence from work so that she may attend to her needs in this matter  -     venlafaxine (EFFEXOR-XR) 75 MG 24 hr capsule; Take 2 capsules (150 mg total) by mouth once daily.  Dispense: 60 capsule; Refill: 3  Primary hypertension  BP as at goal today  Continue benazepril 20 mg daily with close monitoring  Palpitations  Intermittent and appear anxiety related  Consider low-dose beta-blocker  Constipation, new onset  Polyp of colon, unspecified part of colon, unspecified type  Increase intake of water  Ensure consumption of adequate dietary fiber from complex carbohydrates as found in fresh fruits and vegetables  May consider addition of MiraLax if needed or fiber supplement such as Citrucel  Patient is overdue for colonoscopy and has a history of polyps  Referral placed for colonoscopy  -     Ambulatory referral/consult to Gastroenterology; Future; Expected date: 05/27/2024  Neck pain on right side  Mid back pain  Imaging of cervical and thoracic spine  Consider referral for physical therapy  PRN acetaminophen  Follow up in about 4 weeks (around 6/17/2024).

## 2024-05-20 NOTE — LETTER
May 20, 2024      [facility] [address] [place] [address]  Phone: [phone]       Patient: Jennie Julio   YOB: 1955  Date of Visit: 05/20/2024    To Whom It May Concern:    Genesis Julio  was at [facility] on 05/20/2024. Jennie is having some medical issues for which I have recommended a leave of absence. We will attempt to improve control of her condition. The patient may return to work/school on 6/20/2024. If you have any questions or concerns, or if I can be of further assistance, please do not hesitate to contact me.    Sincerely,    Soni Montano MD

## 2024-06-19 ENCOUNTER — OFFICE VISIT (OUTPATIENT)
Dept: FAMILY MEDICINE | Facility: CLINIC | Age: 69
End: 2024-06-19
Payer: MEDICARE

## 2024-06-19 ENCOUNTER — HOSPITAL ENCOUNTER (OUTPATIENT)
Dept: RADIOLOGY | Facility: HOSPITAL | Age: 69
Discharge: HOME | End: 2024-06-19
Attending: FAMILY MEDICINE
Payer: MEDICARE

## 2024-06-19 VITALS
RESPIRATION RATE: 18 BRPM | OXYGEN SATURATION: 100 % | WEIGHT: 155.44 LBS | TEMPERATURE: 98 F | DIASTOLIC BLOOD PRESSURE: 74 MMHG | BODY MASS INDEX: 25.9 KG/M2 | SYSTOLIC BLOOD PRESSURE: 113 MMHG | HEIGHT: 65 IN | HEART RATE: 72 BPM

## 2024-06-19 DIAGNOSIS — F41.9 CHRONIC ANXIETY: ICD-10-CM

## 2024-06-19 DIAGNOSIS — M25.562 ACUTE PAIN OF LEFT KNEE: ICD-10-CM

## 2024-06-19 DIAGNOSIS — I10 PRIMARY HYPERTENSION: Primary | ICD-10-CM

## 2024-06-19 DIAGNOSIS — K59.00 CONSTIPATION, UNSPECIFIED CONSTIPATION TYPE: ICD-10-CM

## 2024-06-19 DIAGNOSIS — M79.605 LEFT LEG PAIN: ICD-10-CM

## 2024-06-19 DIAGNOSIS — Z12.11 COLON CANCER SCREENING: ICD-10-CM

## 2024-06-19 DIAGNOSIS — H91.92 HEARING LOSS OF LEFT EAR, UNSPECIFIED HEARING LOSS TYPE: ICD-10-CM

## 2024-06-19 DIAGNOSIS — F32.A MILD DEPRESSION: ICD-10-CM

## 2024-06-19 DIAGNOSIS — H93.13 TINNITUS, BILATERAL: ICD-10-CM

## 2024-06-19 PROCEDURE — 73564 X-RAY EXAM KNEE 4 OR MORE: CPT | Mod: TC,LT

## 2024-06-19 PROCEDURE — 99215 OFFICE O/P EST HI 40 MIN: CPT | Mod: PBBFAC,25 | Performed by: FAMILY MEDICINE

## 2024-06-19 RX ORDER — BENAZEPRIL HYDROCHLORIDE 20 MG/1
20 TABLET ORAL DAILY
Qty: 90 TABLET | Refills: 3 | Status: SHIPPED | OUTPATIENT
Start: 2024-06-19 | End: 2024-07-08

## 2024-06-19 RX ORDER — CETIRIZINE HYDROCHLORIDE 10 MG/1
10 TABLET ORAL DAILY
COMMUNITY

## 2024-06-19 RX ORDER — VENLAFAXINE HYDROCHLORIDE 75 MG/1
150 CAPSULE, EXTENDED RELEASE ORAL DAILY
Qty: 60 CAPSULE | Refills: 3 | Status: SHIPPED | OUTPATIENT
Start: 2024-06-19 | End: 2024-07-08 | Stop reason: SDUPTHER

## 2024-06-19 NOTE — PROGRESS NOTES
Subjective:       Patient ID: Jennie Julio is a 68 y.o. female.    Chief Complaint: Follow-up and Anxiety    Follow-up  Associated symptoms include arthralgias and neck pain. Pertinent negatives include no chest pain, headaches, joint swelling, vomiting or weakness.   Anxiety  Patient reports no chest pain, confusion, dysphagia or palpitations.         Jennie is an established 68-year-old patient who presents today for follow-up of hypertension, chronic anxiety and depression, lumbar degenerative disc disease with sciatica.  She continues to report pain in her neck/upper back and knees.  He has benefitted from her leave of absence from work.  She has not pursued physical therapy because her fiance is moving in with her and they have been rearranging things at her home.  Additionally her nephew is now living with she and her sister and helping with the care of her sister who has Parkinson's disease and is very debilitated.  Her anxiety has been slightly better she is not working at this time.  The Effexor XR 75 does significantly help and she is considering whether an increase in dose may be beneficial.  Osteoarthritis cervical and thoracic spine/knees  She continues to complain of neck and thoracic pain  She is also reporting some knee pain  She has a history of remote injury while playing basketball in high school  She still has not been able to pursue physical therapy due to moving her fiance in getting settled at home, her dogs illnesses and her sister's medical issues  He is having pain in her left knee greater than her right knee  She has had imaging in November of 2022 which demonstrated degenerative change of the medial and lateral compartments  Hypertension  BP at goal at 113/74  Prior cardiac evaluation   Exercise treadmill testing (01/08/2024)  Resting EKG demonstrated sinus rhythm without ST changes  Nonspecific ST and T changes with sinus tachycardia at peak exercise  Impression:  Stress EKG is normal  Maximal  exercise treadmill test (97%)  Heart rate recovery is normal  Functional capacity is fair (7.1 Mets)  Study quality is good  Carotid ultrasound with mild disease bilaterally (02/09/2023)   TTE EF 65% with normal left ventricular systolic function/grade 1 diastolic function  Mild tricuspid regurgitation (02/09/2023)   Holter monitor 12/30/2022  -predominant rhythm sinus with heart rate  and average of 90 bpm.  -no ventricular tachycardia or SVT  -rare PVCs (total 93, average 1.94 per hour)  -rare PACs (total 90, average 1.88 per hour)  PMH  Hypertension well-controlled  Chronic in anxiety and depression-well-controlled  Osteopenia-last BMD 01/18/2023 will repeat 01/2025  Onychomycosis and tinea pedis-improved/resolved  Lumbar degenerative disc disease with sciatica  Mixed hyperlipidemia  Osteoarthritis ankle  Osteoarthritis knees  Osteoarthritis cervical/thoracic spine  MEDS  Aspirin 81 mg daily  Lipitor 40 mg daily  Lisinopril 20 mg daily  Flonase-currently on hold  Gabapentin 100 mg q.a.m. and 300 mg HS  Alive Women's 50+ gummies  Effexor XR 75 mg daily     Health Maintenance:  Colon Cancer screening: due in 05/05/2024-patient has not been contacted  Breast cancer screening: Mammogram 02/01/2024 - BI-RADS 1 bilaterally; repeat in 1 year  Cervical Cancer Screen: 09/28/2021 ; repeat September 2024  Immunizations: UTD        Review of Systems   Constitutional:  Positive for activity change and unexpected weight change.   HENT:  Positive for hearing loss. Negative for rhinorrhea and trouble swallowing.    Eyes:  Negative for discharge and visual disturbance.   Respiratory:  Negative for chest tightness and wheezing.    Cardiovascular:  Negative for chest pain and palpitations.   Gastrointestinal:  Negative for blood in stool, constipation, diarrhea and vomiting.   Endocrine: Negative for polydipsia and polyuria.   Genitourinary:  Negative for difficulty urinating, dysuria, hematuria and menstrual problem.  "  Musculoskeletal:  Positive for arthralgias and neck pain. Negative for joint swelling.   Neurological:  Negative for weakness and headaches.   Psychiatric/Behavioral:  Negative for confusion and dysphoric mood.             Objective:      Vitals:    06/19/24 0946   BP: 113/74   Pulse: 72   Resp: 18   Temp: 97.9 °F (36.6 °C)   /74 (BP Location: Right arm, Patient Position: Sitting, BP Method: Medium (Automatic))   Pulse 72   Temp 97.9 °F (36.6 °C) (Oral)   Resp 18   Ht 5' 5" (1.651 m)   Wt 70.5 kg (155 lb 6.8 oz)   SpO2 100%   BMI 25.86 kg/m²       Physical Exam  Constitutional:       General: She is not in acute distress.     Appearance: She is not ill-appearing, toxic-appearing or diaphoretic.   Neurological:      Mental Status: She is alert.   Psychiatric:         Mood and Affect: Mood is not anxious or depressed.         Speech: Speech normal.         Behavior: Behavior is cooperative.         Thought Content: Thought content is not paranoid or delusional. Thought content does not include homicidal or suicidal ideation. Thought content does not include homicidal or suicidal plan.      Comments: PHQ-9 Score 3   JIMENA-7 Score 4        Repeat imaging of left knee 06/19/2024    FINDINGS:  No acute displaced fractures or dislocations.     There is some degenerative changes with narrowing of the medial and lateral compartments of the knee joint with presence of marginal osteophytes some narrowing is also identified of the patellofemoral joint articular spaces otherwise preserved with smooth articular surfaces     No blastic or lytic lesions.     Soft tissues within normal limits.     Impression:     Degenerative changes.        Electronically signed by:Curt Golden  Date:                                            06/19/2024  Time:                                           12:40      Assessment/Plan:  Primary hypertension  Hypertension well-controlled  Continue/ Lotensin 20 mg daily  -     benazepriL " (LOTENSIN) 20 MG tablet; Take 1 tablet (20 mg total) by mouth once daily.  Dispense: 90 tablet; Refill: 3  Mild depression  Chronic anxiety  Increase venlafaxine to 150 mg daily  -     venlafaxine (EFFEXOR-XR) 75 MG 24 hr capsule; Take 2 capsules (150 mg total) by mouth once daily.  Dispense: 60 capsule; Refill: 3  Tinnitus, bilateral  Hearing loss of left ear, unspecified hearing loss type  Referral to audiology for further evaluation  -     Ambulatory referral/consult to Audiology; Future; Expected date: 06/26/2022  Acute pain of left knee  Left leg pain  Discussed possibility of CSI for documented osteoarthritis on imaging done today.  Patient defers at this time  Images reviewed independently and report also reviewed  May continue to use p.r.n. Tylenol and or Voltaren gel  -     X-Ray Knee Complete 4 or More Views Left; Future; Expected date: 06/19/2024  Constipation, unspecified constipation type  Colon cancer screening  Refer to established gastroenterologist for colon cancer screening/sooner colonoscopy for constipation  Constipation may be contributed to by use of Effexor   Increase fiber and water consumption  Follow up in about 3 weeks (around 7/10/2024).

## 2024-06-19 NOTE — LETTER
June 19, 2024      [facility] [address] [place] [address]  Phone: [phone]       Patient: Jennie Julio   YOB: 1955  Date of Visit: 06/19/2024    To Whom It May Concern:    Genesis Julio  was at [facility] on 06/19/2024. Please excuse absence from 6/19/2024 through 7/10/2024. I will re-evaluate her ability to return to work at our next visit 7/10/24. If you have any questions or concerns, or if I can be of further assistance, please do not hesitate to contact me.    Sincerely,    Soni Montano MD

## 2024-07-08 ENCOUNTER — OFFICE VISIT (OUTPATIENT)
Dept: FAMILY MEDICINE | Facility: CLINIC | Age: 69
End: 2024-07-08
Payer: MEDICARE

## 2024-07-08 VITALS
RESPIRATION RATE: 18 BRPM | DIASTOLIC BLOOD PRESSURE: 69 MMHG | BODY MASS INDEX: 26.08 KG/M2 | TEMPERATURE: 98 F | WEIGHT: 156.5 LBS | HEART RATE: 87 BPM | OXYGEN SATURATION: 100 % | HEIGHT: 65 IN | SYSTOLIC BLOOD PRESSURE: 106 MMHG

## 2024-07-08 DIAGNOSIS — K59.00 CONSTIPATION, UNSPECIFIED CONSTIPATION TYPE: ICD-10-CM

## 2024-07-08 DIAGNOSIS — I10 PRIMARY HYPERTENSION: Primary | ICD-10-CM

## 2024-07-08 DIAGNOSIS — M54.41 CHRONIC BILATERAL LOW BACK PAIN WITH RIGHT-SIDED SCIATICA: ICD-10-CM

## 2024-07-08 DIAGNOSIS — F41.9 CHRONIC ANXIETY: ICD-10-CM

## 2024-07-08 DIAGNOSIS — R59.0 ANTERIOR CERVICAL ADENOPATHY: ICD-10-CM

## 2024-07-08 DIAGNOSIS — G89.29 CHRONIC BILATERAL LOW BACK PAIN WITH RIGHT-SIDED SCIATICA: ICD-10-CM

## 2024-07-08 DIAGNOSIS — M17.12 PRIMARY OSTEOARTHRITIS OF LEFT KNEE: ICD-10-CM

## 2024-07-08 DIAGNOSIS — R61 NIGHT SWEATS: ICD-10-CM

## 2024-07-08 PROBLEM — Z12.11 ENCOUNTER FOR SCREENING FOR MALIGNANT NEOPLASM OF COLON: Status: ACTIVE | Noted: 2019-05-15

## 2024-07-08 PROBLEM — I34.1 MVP (MITRAL VALVE PROLAPSE): Status: ACTIVE | Noted: 2024-07-08

## 2024-07-08 PROBLEM — Z86.010 HISTORY OF COLONIC POLYPS: Status: ACTIVE | Noted: 2024-06-25

## 2024-07-08 PROBLEM — Z86.0100 HISTORY OF COLONIC POLYPS: Status: ACTIVE | Noted: 2024-06-25

## 2024-07-08 PROCEDURE — 99214 OFFICE O/P EST MOD 30 MIN: CPT | Mod: PBBFAC | Performed by: FAMILY MEDICINE

## 2024-07-08 RX ORDER — GABAPENTIN 100 MG/1
CAPSULE ORAL
Qty: 180 CAPSULE | Refills: 2 | Status: SHIPPED | OUTPATIENT
Start: 2024-07-08

## 2024-07-08 RX ORDER — VENLAFAXINE HYDROCHLORIDE 150 MG/1
150 CAPSULE, EXTENDED RELEASE ORAL DAILY
Qty: 30 CAPSULE | Refills: 2 | Status: SHIPPED | OUTPATIENT
Start: 2024-07-08 | End: 2024-10-06

## 2024-07-08 RX ORDER — BENAZEPRIL HYDROCHLORIDE 20 MG/1
10 TABLET ORAL DAILY
Qty: 45 TABLET | Refills: 3 | Status: SHIPPED | OUTPATIENT
Start: 2024-07-08

## 2024-07-08 NOTE — LETTER
July 8, 2024      [facility] [address] [place] [address]  Phone: [phone]       Patient: Jennie Julio   YOB: 1955  Date of Visit: 07/08/2024    To Whom It May Concern:    Genesis Julio  was at [facility] on 07/08/2024. The patient may return to work/school on 7/15/2024 with restrictions. Patient is prone to dehydration and should have very limited exposure to the heat. If you have any questions or concerns, or if I can be of further assistance, please do not hesitate to contact me.    Sincerely,    Soni Montano MD

## 2024-07-08 NOTE — PROGRESS NOTES
Subjective:       Patient ID: Jennie Julio is a 68 y.o. female.    Chief Complaint: Anxiety, Follow-up, and Hypertension    HPI  Jennie is an established 68-year-old patient who presents today for follow-up of hypertension, chronic anxiety and depression, lumbar degenerative disc disease with sciatica.  She continues to report pain in her neck/upper back and knees.  She continues to benefit from her leave of absence from work.  She has not pursued physical therapy because her fiance is moving in with her and they have been rearranging things at her home. Her anxiety has been slightly better she is not working at this time, because her boyfriend is living with her and because her nephew is present to help with her sister.  She is denying any significant side effects from the Effexor  mg daily.  She is taken it consistently.    New complaint  Left lateral neck tenderness and swelling  Osteoarthritis cervical and thoracic spine/knees  Her neck and thoracic pain has improved since she has not working  Continues to experience some knee pain due to other degenerative arthritis  She will consider corticosteroid injection but will like to defer for now  Hypertension  BP at goal at 106/69  BP is slightly low today probably due to reduction in anxiety  Prior cardiac evaluation   Exercise treadmill testing (01/08/2024)  Resting EKG demonstrated sinus rhythm without ST changes  Nonspecific ST and T changes with sinus tachycardia at peak exercise  Impression:  Stress EKG is normal  Maximal exercise treadmill test (97%)  Heart rate recovery is normal  Functional capacity is fair (7.1 Mets)  Study quality is good  Carotid ultrasound with mild disease bilaterally (02/09/2023)   TTE EF 65% with normal left ventricular systolic function/grade 1 diastolic function  Mild tricuspid regurgitation (02/09/2023)   Holter monitor 12/30/2022  -predominant rhythm sinus with heart rate  and average of 90 bpm.  -no ventricular tachycardia  "or SVT  -rare PVCs (total 93, average 1.94 per hour)  -rare PACs (total 90, average 1.88 per hour)  PMH  Hypertension well-controlled  Chronic in anxiety and depression-well-controlled  Osteopenia-last BMD 01/18/2023 will repeat 01/2025  Onychomycosis and tinea pedis-improved/resolved  Lumbar degenerative disc disease with sciatica  Mixed hyperlipidemia  Osteoarthritis ankle  Osteoarthritis knees  Osteoarthritis cervical/thoracic spine  MEDS  Aspirin 81 mg daily  Lipitor 40 mg daily  Lisinopril 20 mg daily  Flonase-currently on hold  Gabapentin 100 mg q.a.m. and 300 mg HS  Alive Women's 50+ gummies  Effexor XR 75 mg daily     Health Maintenance:  Colon Cancer screening: due in 05/05/2024-patient has not been contacted  Breast cancer screening: Mammogram 02/01/2024 - BI-RADS 1 bilaterally; repeat in 1 year  Cervical Cancer Screen: 09/28/2021 ; repeat September 2024  Immunizations: UTD     Review of Systems  See HPI       Objective:      Vitals:    07/08/24 1351   BP: 106/69   Pulse: 87   Resp: 18   Temp: 98.1 °F (36.7 °C)   /69 (BP Location: Right arm, Patient Position: Sitting, BP Method: Medium (Automatic))   Pulse 87   Temp 98.1 °F (36.7 °C) (Oral)   Resp 18   Ht 5' 5" (1.651 m)   Wt 71 kg (156 lb 8.4 oz)   SpO2 100%   BMI 26.05 kg/m² '    Physical Exam  Constitutional:       General: She is not in acute distress.     Appearance: She is normal weight. She is not ill-appearing, toxic-appearing or diaphoretic.   Cardiovascular:      Rate and Rhythm: Normal rate and regular rhythm.      Heart sounds: No murmur heard.     No gallop.   Pulmonary:      Effort: Pulmonary effort is normal. No respiratory distress.      Breath sounds: No stridor. No wheezing, rhonchi or rales.   Neurological:      Mental Status: She is alert and oriented to person, place, and time.       06/19/2024  X-ray left knee-degenerative changes medial and lateral compartments; marginal osteophytes of the patellofemoral joint " space    Assessment/Plan:  Primary hypertension  Given relatively low blood pressure today will resume 10 mg of Lotensin and monitor blood pressure closely  Transient elevations in blood pressure may have been due to increased anxiety  -     benazepriL (LOTENSIN) 20 MG tablet; Take 0.5 tablets (10 mg total) by mouth once daily.  Dispense: 45 tablet; Refill: 3  Chronic anxiety  Continue venlafaxine 150 mg daily  Change to 150 mg capsule today  -     venlafaxine (EFFEXOR-XR) 150 MG Cp24; Take 1 capsule (150 mg total) by mouth once daily.  Dispense: 30 capsule; Refill: 2  Chronic bilateral low back pain with right-sided sciatica  Continue to encouraged to consider PT/aquatic therapy  May titrate gabapentin to 200 mg q.a.m., 100 mg mid day and 200 mg HS with eventual intention to increase to 200 t.i.d.  -     gabapentin (NEURONTIN) 100 MG capsule; TAKE 2 CAPSULES BY MOUTH EVERY MORNING, NOON AND NIGHT  Dispense: 180 capsule; Refill: 2  Night sweats without weight loss or other symptoms  Increasing gabapentin may also help as these are possible perimenopausal vasomotor symptoms  CMP/CBC normal 02/01/2024  Will check TSH before next visit  Consider chest x-ray, additional imaging  Primary osteoarthritis of left knee  We will consider CSI at future visit  May use OTC arthritis strength Tylenol and Voltaren gel as directed  Anterior right sided cervical adenopathy  Ultrasound of the thyroid and soft tissues of the neck  TSH as above   Will also check CBC, sed rate, CRP given night sweats associated with adenopathy  -     US Soft Tissue Head Neck/thyroid; Future; Expected date: 07/08/2024  Follow up in about 6 weeks (around 8/19/2024).

## 2024-07-17 ENCOUNTER — CLINICAL SUPPORT (OUTPATIENT)
Dept: AUDIOLOGY | Facility: HOSPITAL | Age: 69
End: 2024-07-17
Attending: FAMILY MEDICINE
Payer: MEDICARE

## 2024-07-17 DIAGNOSIS — H93.13 TINNITUS, BILATERAL: ICD-10-CM

## 2024-07-17 DIAGNOSIS — H91.92 HEARING LOSS OF LEFT EAR, UNSPECIFIED HEARING LOSS TYPE: ICD-10-CM

## 2024-07-17 DIAGNOSIS — H90.3 SENSORINEURAL HEARING LOSS, BILATERAL: Primary | ICD-10-CM

## 2024-07-17 PROCEDURE — 92567 TYMPANOMETRY: CPT | Performed by: AUDIOLOGIST

## 2024-07-17 PROCEDURE — 92557 COMPREHENSIVE HEARING TEST: CPT | Performed by: AUDIOLOGIST

## 2024-07-17 NOTE — PROGRESS NOTES
Hearing Evaluation        Patient History: Ms. Julio is a 68 y.o. female in for a hearing evaluation reporting a gradual decrease in hearing, onset years ago.  She also reports occasional bilateral tinnitus. Vertigo and middle ear issues are denied. All additional history is unremarkable.        Test Results:                    Pure Tone Testing:      Right ear:       Mild to moderate sensorineural hearing loss from 250-8kHz. Speech reception threshold is in agreement with puretone findings.  Discrimination score of 100% is considered excellent.        Left ear:          Mild to moderate sensorineural hearing loss from 250-8kHz. Speech reception threshold is in agreement with puretone findings.  Discrimination score of 100% is considered excellent.                                                                            Tympanometry:                                           Right ear:       Type 'A' tymp, normal middle ear pressure/function     Left ear:          Type 'A' tymp, normal middle ear pressure/function          Interpretations:      Behavioral test findings indicate a mild to moderate SNHL, AU. Speech reception thresholds obtained at 35dB, AU, and are in agreement with puretone findings. Speech discrimination scores of 100%, AU, are considered excellent.  Immittance measures indicate normal middle ear pressure/function, bilaterally.            Recommendations:   Annual hearing evaluations are recommended to monitor for progression. Not a candidate for hearing aids and not interested at this time.

## 2024-07-24 ENCOUNTER — LAB VISIT (OUTPATIENT)
Dept: LAB | Facility: HOSPITAL | Age: 69
End: 2024-07-24
Attending: FAMILY MEDICINE
Payer: MEDICARE

## 2024-07-24 DIAGNOSIS — R61 NIGHT SWEATS: ICD-10-CM

## 2024-07-24 DIAGNOSIS — R59.0 ANTERIOR CERVICAL ADENOPATHY: ICD-10-CM

## 2024-07-24 LAB
BASOPHILS # BLD AUTO: 0.02 X10(3)/MCL
BASOPHILS NFR BLD AUTO: 0.4 %
CRP SERPL-MCNC: <1 MG/L
EOSINOPHIL # BLD AUTO: 0.22 X10(3)/MCL (ref 0–0.9)
EOSINOPHIL NFR BLD AUTO: 4.6 %
ERYTHROCYTE [DISTWIDTH] IN BLOOD BY AUTOMATED COUNT: 13.4 % (ref 11.5–17)
ERYTHROCYTE [SEDIMENTATION RATE] IN BLOOD: 1 MM/HR (ref 0–20)
HCT VFR BLD AUTO: 40.6 % (ref 37–47)
HGB BLD-MCNC: 13.9 G/DL (ref 12–16)
IMM GRANULOCYTES # BLD AUTO: 0.01 X10(3)/MCL (ref 0–0.04)
IMM GRANULOCYTES NFR BLD AUTO: 0.2 %
LYMPHOCYTES # BLD AUTO: 1.76 X10(3)/MCL (ref 0.6–4.6)
LYMPHOCYTES NFR BLD AUTO: 36.9 %
MCH RBC QN AUTO: 30.4 PG (ref 27–31)
MCHC RBC AUTO-ENTMCNC: 34.2 G/DL (ref 33–36)
MCV RBC AUTO: 88.8 FL (ref 80–94)
MONOCYTES # BLD AUTO: 0.63 X10(3)/MCL (ref 0.1–1.3)
MONOCYTES NFR BLD AUTO: 13.2 %
NEUTROPHILS # BLD AUTO: 2.13 X10(3)/MCL (ref 2.1–9.2)
NEUTROPHILS NFR BLD AUTO: 44.7 %
NRBC BLD AUTO-RTO: 0 %
PLATELET # BLD AUTO: 285 X10(3)/MCL (ref 130–400)
PMV BLD AUTO: 9 FL (ref 7.4–10.4)
RBC # BLD AUTO: 4.57 X10(6)/MCL (ref 4.2–5.4)
TSH SERPL-ACNC: 1.96 UIU/ML (ref 0.35–4.94)
WBC # BLD AUTO: 4.77 X10(3)/MCL (ref 4.5–11.5)

## 2024-07-24 PROCEDURE — 36415 COLL VENOUS BLD VENIPUNCTURE: CPT

## 2024-07-24 PROCEDURE — 85025 COMPLETE CBC W/AUTO DIFF WBC: CPT

## 2024-07-24 PROCEDURE — 85652 RBC SED RATE AUTOMATED: CPT

## 2024-07-24 PROCEDURE — 86140 C-REACTIVE PROTEIN: CPT

## 2024-07-24 PROCEDURE — 84443 ASSAY THYROID STIM HORMONE: CPT

## 2024-07-26 ENCOUNTER — HOSPITAL ENCOUNTER (OUTPATIENT)
Dept: RADIOLOGY | Facility: HOSPITAL | Age: 69
Discharge: HOME | End: 2024-07-26
Attending: FAMILY MEDICINE
Payer: MEDICARE

## 2024-07-26 DIAGNOSIS — R59.0 ANTERIOR CERVICAL ADENOPATHY: ICD-10-CM

## 2024-07-26 PROCEDURE — 76536 US EXAM OF HEAD AND NECK: CPT | Mod: TC

## 2024-07-29 ENCOUNTER — OFFICE VISIT (OUTPATIENT)
Dept: FAMILY MEDICINE | Facility: CLINIC | Age: 69
End: 2024-07-29
Payer: MEDICARE

## 2024-07-29 VITALS
HEIGHT: 65 IN | RESPIRATION RATE: 18 BRPM | DIASTOLIC BLOOD PRESSURE: 64 MMHG | OXYGEN SATURATION: 95 % | WEIGHT: 156.31 LBS | HEART RATE: 86 BPM | SYSTOLIC BLOOD PRESSURE: 100 MMHG | TEMPERATURE: 98 F | BODY MASS INDEX: 26.04 KG/M2

## 2024-07-29 DIAGNOSIS — I10 PRIMARY HYPERTENSION: ICD-10-CM

## 2024-07-29 DIAGNOSIS — M17.12 PRIMARY OSTEOARTHRITIS OF LEFT KNEE: ICD-10-CM

## 2024-07-29 DIAGNOSIS — F41.9 CHRONIC ANXIETY: Primary | Chronic | ICD-10-CM

## 2024-07-29 DIAGNOSIS — G89.29 CHRONIC BILATERAL LOW BACK PAIN WITH RIGHT-SIDED SCIATICA: ICD-10-CM

## 2024-07-29 DIAGNOSIS — Z83.719 FAMILY HISTORY OF COLONIC POLYPS: ICD-10-CM

## 2024-07-29 DIAGNOSIS — M54.41 CHRONIC BILATERAL LOW BACK PAIN WITH RIGHT-SIDED SCIATICA: ICD-10-CM

## 2024-07-29 DIAGNOSIS — F32.A MILD DEPRESSION: ICD-10-CM

## 2024-07-29 PROCEDURE — 99214 OFFICE O/P EST MOD 30 MIN: CPT | Mod: PBBFAC | Performed by: FAMILY MEDICINE

## 2024-07-29 NOTE — PROGRESS NOTES
Green with Sandee cream Subjective:       Patient ID: Jennie Julio is a 68 y.o. female.    Chief Complaint: Follow-up (Discuss result of Thyroid US/Left Lower leg pain)    Follow-up  Associated symptoms include arthralgias, joint swelling and neck pain. Pertinent negatives include no chest pain, headaches or vomiting.     Jennie is an established 68-year-old patient who presents today for follow-up of hypertension, chronic anxiety and depression, lumbar degenerative disc disease with sciatica.  She continues to report pain in her neck/upper back and knees.  She continues to benefit from her leave of absence from work.  She has not been able to attend physical therapy due to constraints of taking care of her sister and moving in with her fiance.  She reports significant worsening of her left knee pain.  She completed hearing test with diagnosis of mild-to-moderate SNHL without recommendation for hearing aids.  Records reviewed.  Interval history  Left lateral neck tenderness and swelling improved  Thyroid ultrasound significant for TR3 left thyroid lobe 1.6 cm isoechoic solid nodule  And hypoechoic 0.5 cm TR 4 nodule in the isthmus  Recommendation for follow-up in 1 year  No significant adenopathy noted  Symptoms likely musculoskeletal  Osteoarthritis cervical and thoracic spine/knees  Her neck and thoracic pain has improved since she has not working  Continues to experience some knee pain due to other degenerative arthritis  She will consider corticosteroid injection at next visit  Hypertension  BP at goal at 100/64  BP is slightly low today probably due to reduction in anxiety  Prior cardiac evaluation   Exercise treadmill testing (01/08/2024)  Resting EKG demonstrated sinus rhythm without ST changes  Nonspecific ST and T changes with sinus tachycardia at peak exercise  Impression:  Stress EKG is normal  Maximal exercise treadmill test (97%)  Heart rate recovery is normal  Functional capacity is fair (7.1 Mets)  Study  quality is good  Carotid ultrasound with mild disease bilaterally (02/09/2023)   TTE EF 65% with normal left ventricular systolic function/grade 1 diastolic function  Mild tricuspid regurgitation (02/09/2023)   Holter monitor 12/30/2022  -predominant rhythm sinus with heart rate  and average of 90 bpm.  -no ventricular tachycardia or SVT  -rare PVCs (total 93, average 1.94 per hour)  -rare PACs (total 90, average 1.88 per hour)  PMH  Hypertension well-controlled  Chronic in anxiety and depression-well-controlled  Osteopenia-last BMD 01/18/2023 will repeat 01/2025  Onychomycosis and tinea pedis-improved/resolved  Lumbar degenerative disc disease with sciatica  Mixed hyperlipidemia  Osteoarthritis ankle  Osteoarthritis knees  Osteoarthritis cervical/thoracic spine  Mild to moderate sensorineural hearing loss bilaterally without recommendation for hearing aids this time (07/17/2024)  MEDS  Aspirin 81 mg daily  Lipitor 40 mg daily  Lisinopril 20 mg daily  Flonase-currently on hold  Gabapentin 100 mg q.a.m. and 300 mg HS  Alive Women's 50+ gummies  Effexor XR 75 mg daily     Health Maintenance:  Colon Cancer screening: due in 05/05/2024-patient has not been contacted  Breast cancer screening: Mammogram 02/01/2024 - BI-RADS 1 bilaterally; repeat in 1 year  Cervical Cancer Screen: 09/28/2021 ; repeat September 2024  Immunizations: UTD  Review of Systems   Constitutional:  Positive for activity change.   HENT:  Positive for hearing loss and trouble swallowing.    Eyes:  Negative for discharge.   Respiratory:  Negative for chest tightness and wheezing.    Cardiovascular:  Negative for chest pain and palpitations.   Gastrointestinal:  Negative for constipation, diarrhea and vomiting.   Genitourinary:  Negative for difficulty urinating and hematuria.   Musculoskeletal:  Positive for arthralgias, joint swelling and neck pain.   Neurological:  Negative for headaches.   Psychiatric/Behavioral:  Negative for dysphoric mood.   "           Objective:      Vitals:    07/29/24 1449   BP: 100/64   Pulse: 86   Resp: 18   Temp: 97.9 °F (36.6 °C)   /64 (BP Location: Right arm, Patient Position: Sitting, BP Method: Medium (Automatic))   Pulse 86   Temp 97.9 °F (36.6 °C) (Oral)   Resp 18   Ht 5' 5" (1.651 m)   Wt 70.9 kg (156 lb 4.9 oz)   SpO2 95%   BMI 26.01 kg/m²      Physical Exam  Constitutional:       General: She is not in acute distress.     Appearance: She is not ill-appearing, toxic-appearing or diaphoretic.   Cardiovascular:      Rate and Rhythm: Normal rate and regular rhythm.      Pulses: Normal pulses.      Heart sounds: No murmur heard.     No gallop.   Pulmonary:      Effort: Pulmonary effort is normal. No respiratory distress.      Breath sounds: No stridor. No wheezing, rhonchi or rales.   Musculoskeletal:      Right knee: No swelling, effusion or erythema. Normal range of motion. No tenderness.      Left knee: Swelling, effusion (mild), bony tenderness and crepitus present. No erythema. Normal range of motion. Tenderness present.      Right lower leg: No edema.      Left lower leg: No edema.   Neurological:      Mental Status: She is alert and oriented to person, place, and time.           07/24/2024  WBC 4.77 H&H 13.9/40.6 platelet count 285  Sed rate 1  CRP< 1  TSH 1.96  US Thyroid  Right thyroid lobe measures 4.4 cm.  Left thyroid lobe measures 4.8 cm.  Isthmus measures 0.6 cm.     Hypoechoic TR 4 nodule in the isthmus measures 0.5 cm.  Normal sized lymph nodes are in both sides of the neck.  TR 3 isoechoic solid nodule on the left measures 1.6 cm.  Smaller hypoechoic nodules measure up to 0.7 cm.     Impression:     Thyroid nodules meet 1 year follow-up criteria.  No FNA warranted at this time.        Electronically signed by:Bj Sousa MD  Date:                                            07/29/2024  Time:                                           17:15  Assessment/Plan:  Chronic anxiety  Mild " depression  Continue with Effexor  mg daily  Anticipate return to work mid August  Primary hypertension  BP well-controlled  Appears to fluctuate with levels of anxiety  Lotensin decreased to half tablet daily starting 07/08/2024  Remains low normal today  May need to further reduce dosage  Continue to monitor at home  Primary osteoarthritis of left knee  Recommend corticosteroid injection to be done at next visit  Chronic bilateral low back pain with right-sided sciatica  -     methocarbamoL (ROBAXIN) 500 MG Tab; Take 1 tablet (500 mg total) by mouth 3 (three) times daily as needed (muscle spasm). May make sleepy.  Do not drive or operate heavy machinery while using  Dispense: 30 tablet; Refill: 0  Left-sided neck pain improving  Night sweats heights and improved continue to monitor  Family history of colonic polyps  Colonoscopy is scheduled with Dr. Finnegan in August  Follow up in about 9 days (around 8/7/2024).

## 2024-07-31 RX ORDER — METHOCARBAMOL 500 MG/1
500 TABLET, FILM COATED ORAL 3 TIMES DAILY PRN
Qty: 30 TABLET | Refills: 0 | Status: SHIPPED | OUTPATIENT
Start: 2024-07-31 | End: 2024-08-10

## 2024-08-07 ENCOUNTER — HOSPITAL ENCOUNTER (OUTPATIENT)
Dept: RADIOLOGY | Facility: HOSPITAL | Age: 69
Discharge: HOME | End: 2024-08-07
Attending: FAMILY MEDICINE
Payer: MEDICARE

## 2024-08-07 ENCOUNTER — OFFICE VISIT (OUTPATIENT)
Dept: FAMILY MEDICINE | Facility: CLINIC | Age: 69
End: 2024-08-07
Payer: MEDICARE

## 2024-08-07 VITALS
HEIGHT: 65 IN | DIASTOLIC BLOOD PRESSURE: 75 MMHG | OXYGEN SATURATION: 96 % | WEIGHT: 155.44 LBS | HEART RATE: 78 BPM | TEMPERATURE: 99 F | RESPIRATION RATE: 18 BRPM | SYSTOLIC BLOOD PRESSURE: 112 MMHG | BODY MASS INDEX: 25.9 KG/M2

## 2024-08-07 DIAGNOSIS — M79.662 PAIN OF LEFT CALF: Primary | ICD-10-CM

## 2024-08-07 DIAGNOSIS — M17.12 PRIMARY OSTEOARTHRITIS OF LEFT KNEE: ICD-10-CM

## 2024-08-07 DIAGNOSIS — M79.662 PAIN OF LEFT CALF: ICD-10-CM

## 2024-08-07 PROCEDURE — 99215 OFFICE O/P EST HI 40 MIN: CPT | Mod: PBBFAC,25 | Performed by: FAMILY MEDICINE

## 2024-08-07 PROCEDURE — 93971 EXTREMITY STUDY: CPT | Mod: LT

## 2024-08-07 RX ORDER — DICLOFENAC SODIUM 10 MG/G
2 GEL TOPICAL DAILY
Qty: 100 G | Refills: 3 | Status: SHIPPED | OUTPATIENT
Start: 2024-08-07 | End: 2024-08-07 | Stop reason: SDUPTHER

## 2024-08-07 RX ORDER — DICLOFENAC SODIUM 10 MG/G
2 GEL TOPICAL 3 TIMES DAILY
Qty: 100 G | Refills: 3 | Status: SHIPPED | OUTPATIENT
Start: 2024-08-07 | End: 2024-08-07 | Stop reason: SDUPTHER

## 2024-08-07 RX ORDER — SOD SULF/POT CHLORIDE/MAG SULF 1.479 G
TABLET ORAL
COMMUNITY
Start: 2024-07-10

## 2024-08-07 RX ORDER — DICLOFENAC SODIUM 10 MG/G
2 GEL TOPICAL 3 TIMES DAILY
Qty: 100 G | Refills: 3 | Status: SHIPPED | OUTPATIENT
Start: 2024-08-07

## 2024-08-13 ENCOUNTER — OFFICE VISIT (OUTPATIENT)
Dept: FAMILY MEDICINE | Facility: CLINIC | Age: 69
End: 2024-08-13
Payer: MEDICARE

## 2024-08-13 VITALS
OXYGEN SATURATION: 96 % | DIASTOLIC BLOOD PRESSURE: 77 MMHG | WEIGHT: 157 LBS | RESPIRATION RATE: 18 BRPM | BODY MASS INDEX: 26.16 KG/M2 | HEART RATE: 80 BPM | SYSTOLIC BLOOD PRESSURE: 120 MMHG | TEMPERATURE: 99 F | HEIGHT: 65 IN

## 2024-08-13 DIAGNOSIS — R68.89 FLU-LIKE SYMPTOMS: Primary | ICD-10-CM

## 2024-08-13 LAB
FLUAV AG UPPER RESP QL IA.RAPID: NOT DETECTED
FLUBV AG UPPER RESP QL IA.RAPID: NOT DETECTED
SARS-COV-2 RNA RESP QL NAA+PROBE: DETECTED

## 2024-08-13 PROCEDURE — 0240U COVID/FLU A&B PCR: CPT

## 2024-08-13 PROCEDURE — 99214 OFFICE O/P EST MOD 30 MIN: CPT | Mod: PBBFAC

## 2024-08-13 RX ORDER — NIRMATRELVIR AND RITONAVIR 300-100 MG
KIT ORAL
Qty: 30 TABLET | Refills: 0 | Status: SHIPPED | OUTPATIENT
Start: 2024-08-13

## 2024-08-13 NOTE — PROGRESS NOTES
[facility] Office Visit Note    CC:   Nasal Congestion, Fatigue, Chills, and Cough     HPI:  Jennie Julio is a 68 y.o. female who presents with flu-like symptoms.  Onset yesterday when she came back, unsure of any sick contact at PT yesterday.  Associated sore-throat, cough, low grade fever of 99.1 F that has since resolved, congestion, runny nose, mild intermittent headaches, weakness, malaise, and diaphoresis.  States appetite good and tolerating oral intake.    Of note patient has had COVID infection in the past, and declined Paxlovid at the time.  GFR >60.  Patient amenable to being prescribed Tamiflu or Paxlovid.    Review of Systems:   Review of Systems   Constitutional:  Positive for malaise/fatigue. Negative for fever.   HENT:  Positive for congestion and sore throat.    Respiratory:  Positive for cough. Negative for shortness of breath.    Cardiovascular:  Negative for chest pain and palpitations.   Gastrointestinal:  Negative for diarrhea, nausea and vomiting.   Genitourinary:  Negative for dysuria.   Musculoskeletal:  Positive for joint pain. Negative for myalgias.   Neurological:  Positive for dizziness and headaches.        Current Outpatient Medications   Medication Instructions    aspirin (ECOTRIN) 81 mg, Oral, Every other day    atorvastatin (LIPITOR) 40 mg, Oral, Daily    benazepriL (LOTENSIN) 10 mg, Oral, Daily    cetirizine (ZYRTEC) 10 mg, Oral, Daily    coenzyme Q10 100 mg Chew Oral    diclofenac sodium (VOLTAREN) 2 g, Topical (Top), 3 times daily    fluticasone propionate (FLONASE) 100 mcg, Each Nostril, Daily    gabapentin (NEURONTIN) 100 MG capsule TAKE 2 CAPSULES BY MOUTH EVERY MORNING, NOON AND NIGHT    mv-mn-folic-lutein-herbal 293 (ALIVE WOMEN'S 50 PLUS GUMMY) 120 mcg-150 mcg -37.5 mg Chew Oral    psyllium husk, with sugar, 2.5 gram Wafr Oral, Daily, 2 wafers daily     SUTAB 1.479-0.188- 0.225 gram tablet TAKE KIT BY MOUTH AS DIRECTED BY YOUR PHYSICIAN DO NOT FOLLOW INSTRUCTIONS ON BOX     venlafaxine (EFFEXOR-XR) 150 mg, Oral, Daily        Physical Exam:   Vitals:    08/13/24 1535   BP: 120/77   Pulse: 80   Resp: 18   Temp: 98.5 °F (36.9 °C)      Physical Exam   Gen: NAD, not-ill appearing, wearing a face mask,   CV: RRR, no murmurs. No LE edema.  Resp: CTAB, breathing non labored on room air.    Assessment/Plan:  1. Flu-like symptoms        - Covid/flu swab  - Antipyretic for fever p.r.n.  - Consider Paxlovid if swab positive for COVID, GFR > 60, no kidney disease  - Tamiflu if positive for flu, within 72 hours of onset  - Recommends patient maintains oral intake and appropriate hydration  - ED precautions discussed, patient presented to ED if worsening symptoms including SOB, CP, dizziness, unremitting malaise.      Return to clinic PRMIRIAM aGllagher MD, MBS  [facility] Resident, [facility]

## 2024-08-14 ENCOUNTER — TELEPHONE (OUTPATIENT)
Dept: FAMILY MEDICINE | Facility: CLINIC | Age: 69
End: 2024-08-14
Payer: MEDICARE

## 2024-08-14 NOTE — PROGRESS NOTES
Results of Covid and flu swab reviewed with patient. Patient has COVID-19 based on swab results. She would like to try Paxlovid . A prescription was sent to [facility] pharmacy. She was instructed to hold her atorvastatin for the next seven days. She was given ED precautions, if she has any shortness of breath, chest pain, confusion, nausea with vomiting, fever, unrelieved by Tylenol or any concerning symptoms.

## 2024-08-14 NOTE — TELEPHONE ENCOUNTER
Called patient with a positive covid test. Paxlovid sent and patient will  this morning at [facility]. Statin held. GFR > 60. Patient reports no worsening symptoms, remains afebrile and no chills. Precaution with rest of family members discussed.     Anthony Gallagher MD, [facility] Resident, -II  7:32 AM

## 2024-08-14 NOTE — PROGRESS NOTES
I have seen the patient, reviewed the resident's history and physical, assessment, plan, and progress note. I have personally interviewed and examined the patient at bedside and: agree with the findings.     Dom Roberts MD  [facility]

## 2024-08-16 RX ORDER — IPRATROPIUM BROMIDE 21 UG/1
2 SPRAY, METERED NASAL 3 TIMES DAILY
Qty: 30 ML | Refills: 0 | Status: SHIPPED | OUTPATIENT
Start: 2024-08-16 | End: 2024-08-26

## 2024-08-16 NOTE — PROGRESS NOTES
Patient had rash develop after starting Paxlovid. She took three doses. She was instructed to take Benadryl Q 6 hrs and given strict ED precautions for shortness of breath, tongue or throat swelling . Today she complained of nasal congestion, post nasal drip and runny nose and was given atrovent

## 2024-08-21 ENCOUNTER — OFFICE VISIT (OUTPATIENT)
Dept: FAMILY MEDICINE | Facility: CLINIC | Age: 69
End: 2024-08-21
Payer: MEDICARE

## 2024-08-21 VITALS
HEART RATE: 69 BPM | BODY MASS INDEX: 25.61 KG/M2 | DIASTOLIC BLOOD PRESSURE: 71 MMHG | TEMPERATURE: 98 F | WEIGHT: 153.69 LBS | SYSTOLIC BLOOD PRESSURE: 109 MMHG | RESPIRATION RATE: 18 BRPM | OXYGEN SATURATION: 99 % | HEIGHT: 65 IN

## 2024-08-21 DIAGNOSIS — J01.90 ACUTE SINUSITIS WITH SYMPTOMS > 10 DAYS: Primary | ICD-10-CM

## 2024-08-21 DIAGNOSIS — U07.1 COVID-19: ICD-10-CM

## 2024-08-21 PROCEDURE — 99214 OFFICE O/P EST MOD 30 MIN: CPT | Mod: PBBFAC | Performed by: FAMILY MEDICINE

## 2024-08-21 RX ORDER — DOXYCYCLINE 100 MG/1
100 CAPSULE ORAL EVERY 12 HOURS
Qty: 20 CAPSULE | Refills: 0 | Status: SHIPPED | OUTPATIENT
Start: 2024-08-21 | End: 2024-09-15 | Stop reason: ALTCHOICE

## 2024-08-21 NOTE — PROGRESS NOTES
"Subjective:       Patient ID: Jennie Julio is a 68 y.o. female.    Chief Complaint: Follow-up (History of Covid- 19.)    HPI  67 y/o established patient with history of hypertension, osteoarthritis of the knees and lumbar spine, hyperlipidemia, mixed anxiety and depression and recent COVID-19 presents for follow-up visit.  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  She experienced nasal congestion, sinus pressure, sore throat, low-grade temp, cough, joint pains and headache.  She was seen in our office in tested positive for COVID-19 08/13/2024.  She elected a trial of Paxlovid and was instructed on which medications to hold but after 3 days experienced a rash and itching which prompted discontinuation of the Paxlovid.  Clinically she did respond positively to the Paxlovid and improved significantly however, she is now experiencing sinus pressure, facial tenderness, persistent congestion and residual cough.  She has had these symptoms for approximately 9-10 days    Review of Systems  See HPI       Objective:      Vitals:    08/21/24 0807   BP: 109/71   Pulse: 69   Resp: 18   Temp: 98.2 °F (36.8 °C)   /71 (BP Location: Right arm, Patient Position: Sitting, BP Method: Medium (Automatic))   Pulse 69   Temp 98.2 °F (36.8 °C) (Oral)   Resp 18   Ht 5' 5" (1.651 m)   Wt 69.7 kg (153 lb 10.6 oz)   SpO2 99%   BMI 25.57 kg/m²       Physical Exam  Constitutional:       General: She is not in acute distress.     Appearance: She is not ill-appearing, toxic-appearing or diaphoretic.   HENT:      Ears:      Comments: Serous otitis media bilaterally     Nose: Congestion and rhinorrhea present.      Comments: Maxillary Sinus tenderness bilaterally     Mouth/Throat:      Pharynx: Posterior oropharyngeal erythema (Very mild) present. No oropharyngeal exudate.   Eyes:      General: No scleral icterus.  Cardiovascular:      Rate and Rhythm: Normal " rate and regular rhythm.      Heart sounds:      No friction rub. No gallop.   Pulmonary:      Effort: No respiratory distress.      Breath sounds: No stridor. No wheezing, rhonchi or rales.   Neurological:      Mental Status: She is alert and oriented to person, place, and time.           Assessment/Plan:    COVID-19 tested positive 08/13/2024 after 1-2 days if symptoms  Acute sinusitis with symptoms > 10 days  Likely secondary sinusitis  We will treat empirically with doxycycline because patient is allergic to sulfa and Keflex and had diarrhea with Augmentin  -     doxycycline (VIBRAMYCIN) 100 MG Cap; Take 1 capsule (100 mg total) by mouth every 12 (twelve) hours.  Dispense: 20 capsule; Refill: 0           Follow up in about 3 weeks (around 9/11/2024).

## 2024-09-12 ENCOUNTER — OFFICE VISIT (OUTPATIENT)
Dept: FAMILY MEDICINE | Facility: CLINIC | Age: 69
End: 2024-09-12
Payer: MEDICARE

## 2024-09-12 VITALS
WEIGHT: 156.81 LBS | SYSTOLIC BLOOD PRESSURE: 129 MMHG | HEART RATE: 81 BPM | TEMPERATURE: 98 F | BODY MASS INDEX: 26.13 KG/M2 | DIASTOLIC BLOOD PRESSURE: 79 MMHG | RESPIRATION RATE: 20 BRPM | HEIGHT: 65 IN | OXYGEN SATURATION: 98 %

## 2024-09-12 DIAGNOSIS — E04.1 THYROID NODULE: ICD-10-CM

## 2024-09-12 DIAGNOSIS — E78.2 MIXED HYPERLIPIDEMIA: ICD-10-CM

## 2024-09-12 DIAGNOSIS — R53.83 FATIGUE, UNSPECIFIED TYPE: ICD-10-CM

## 2024-09-12 DIAGNOSIS — I10 PRIMARY HYPERTENSION: Primary | ICD-10-CM

## 2024-09-12 DIAGNOSIS — R23.2 FACIAL FLUSHING: ICD-10-CM

## 2024-09-12 DIAGNOSIS — U07.1 COVID-19: ICD-10-CM

## 2024-09-12 LAB
ALBUMIN SERPL-MCNC: 3.7 G/DL (ref 3.4–4.8)
ALBUMIN/GLOB SERPL: 1.2 RATIO (ref 1.1–2)
ALP SERPL-CCNC: 73 UNIT/L (ref 40–150)
ALT SERPL-CCNC: 11 UNIT/L (ref 0–55)
ANION GAP SERPL CALC-SCNC: 5 MEQ/L
AST SERPL-CCNC: 16 UNIT/L (ref 5–34)
BASOPHILS # BLD AUTO: 0.02 X10(3)/MCL
BASOPHILS NFR BLD AUTO: 0.4 %
BILIRUB SERPL-MCNC: 0.4 MG/DL
BUN SERPL-MCNC: 13.4 MG/DL (ref 9.8–20.1)
CALCIUM SERPL-MCNC: 9.7 MG/DL (ref 8.4–10.2)
CHLORIDE SERPL-SCNC: 107 MMOL/L (ref 98–107)
CO2 SERPL-SCNC: 29 MMOL/L (ref 23–31)
CREAT SERPL-MCNC: 0.67 MG/DL (ref 0.55–1.02)
CREAT/UREA NIT SERPL: 20
EOSINOPHIL # BLD AUTO: 0.28 X10(3)/MCL (ref 0–0.9)
EOSINOPHIL NFR BLD AUTO: 5.5 %
ERYTHROCYTE [DISTWIDTH] IN BLOOD BY AUTOMATED COUNT: 13.7 % (ref 11.5–17)
GFR SERPLBLD CREATININE-BSD FMLA CKD-EPI: >60 ML/MIN/1.73/M2
GLOBULIN SER-MCNC: 3.2 GM/DL (ref 2.4–3.5)
GLUCOSE SERPL-MCNC: 52 MG/DL (ref 82–115)
HCT VFR BLD AUTO: 41.8 % (ref 37–47)
HGB BLD-MCNC: 14 G/DL (ref 12–16)
IMM GRANULOCYTES # BLD AUTO: 0.01 X10(3)/MCL (ref 0–0.04)
IMM GRANULOCYTES NFR BLD AUTO: 0.2 %
LYMPHOCYTES # BLD AUTO: 1.79 X10(3)/MCL (ref 0.6–4.6)
LYMPHOCYTES NFR BLD AUTO: 35.1 %
MCH RBC QN AUTO: 30.2 PG (ref 27–31)
MCHC RBC AUTO-ENTMCNC: 33.5 G/DL (ref 33–36)
MCV RBC AUTO: 90.1 FL (ref 80–94)
MONOCYTES # BLD AUTO: 0.66 X10(3)/MCL (ref 0.1–1.3)
MONOCYTES NFR BLD AUTO: 12.9 %
NEUTROPHILS # BLD AUTO: 2.34 X10(3)/MCL (ref 2.1–9.2)
NEUTROPHILS NFR BLD AUTO: 45.9 %
NRBC BLD AUTO-RTO: 0 %
PLATELET # BLD AUTO: 311 X10(3)/MCL (ref 130–400)
PMV BLD AUTO: 9.8 FL (ref 7.4–10.4)
POTASSIUM SERPL-SCNC: 4.8 MMOL/L (ref 3.5–5.1)
PROT SERPL-MCNC: 6.9 GM/DL (ref 5.8–7.6)
RBC # BLD AUTO: 4.64 X10(6)/MCL (ref 4.2–5.4)
SODIUM SERPL-SCNC: 141 MMOL/L (ref 136–145)
WBC # BLD AUTO: 5.1 X10(3)/MCL (ref 4.5–11.5)

## 2024-09-12 PROCEDURE — 85025 COMPLETE CBC W/AUTO DIFF WBC: CPT | Performed by: FAMILY MEDICINE

## 2024-09-12 PROCEDURE — 99215 OFFICE O/P EST HI 40 MIN: CPT | Mod: PBBFAC | Performed by: FAMILY MEDICINE

## 2024-09-12 PROCEDURE — 80053 COMPREHEN METABOLIC PANEL: CPT | Performed by: FAMILY MEDICINE

## 2024-09-12 PROCEDURE — 36415 COLL VENOUS BLD VENIPUNCTURE: CPT | Performed by: FAMILY MEDICINE

## 2024-09-12 NOTE — PROGRESS NOTES
Subjective:       Patient ID: Jennie Julio is a 68 y.o. female.    Chief Complaint: Knee Pain (Left knee ) and Fatigue    Knee Pain     Fatigue  Associated symptoms include chills, fatigue, headaches, neck pain and weakness. Pertinent negatives include no arthralgias, chest pain, joint swelling or vomiting.   Jennie is a 67 y/o established patient with a history of hypertension, chronic anxiety and depression, lumbar degenerative disc disease with sciatica and recent COVID-19 infection.   Interval history  COVID-19   Symptoms started 8/12/2024 fatigue, sore throat, cough, congestions, HA , low grade fever  Seen in [facility] 8/13/2024 and tested positive for COVID-19  Treated with Paxlovid but developed rash and itching and stopped it after 2 days with resolution of rash  Has some lingering fatigue but it otherwise feeling well most of the time   She did have a weak spell yesterday after waking up feeling fine initially and she had 1-2 episodes of loose stools which have since resolved.  She has had some transient facial flushing which was noted today in office.  Thyroid nodules  Left lateral neck tenderness and swelling resolved   Patient expresses concern for recommendation for observation for TR 4 nodule  She would like to consult with ENT re options for management and follow up  Thyroid ultrasound 7/26/2024 significant for TR3 left thyroid lobe 1.6 cm isoechoic solid nodule  And hypoechoic 0.5 cm TR 4 nodule in the isthmus. No significant adenopathy noted.Radiology Recommendation for follow-up in 1 year  Osteoarthritis cervical and thoracic spine/knees  Her neck and thoracic pain has improved since she has not working  Knee pain improved with PT   Consider CSI if no improvement   Hypertension  BP at goal at 129/79  BP is slightly low today probably due to reduction in anxiety  Prior cardiac evaluation   Exercise treadmill testing (01/08/2024)  Resting EKG demonstrated sinus rhythm without ST changes  Nonspecific ST and T  changes with sinus tachycardia at peak exercise  Impression:  Stress EKG is normal  Maximal exercise treadmill test (97%)  Heart rate recovery is normal  Functional capacity is fair (7.1 Mets)  Study quality is good  Carotid ultrasound with mild disease bilaterally (02/09/2023)   TTE EF 65% with normal left ventricular systolic function/grade 1 diastolic function  Mild tricuspid regurgitation (02/09/2023)   Holter monitor 12/30/2022  -predominant rhythm sinus with heart rate  and average of 90 bpm.  -no ventricular tachycardia or SVT  -rare PVCs (total 93, average 1.94 per hour)  -rare PACs (total 90, average 1.88 per hour)  PMH  Hypertension well-controlled  Chronic in anxiety and depression-well-controlled  Osteopenia-last BMD 01/18/2023 will repeat 01/2025  Onychomycosis and tinea pedis-improved/resolved  Lumbar degenerative disc disease with sciatica  Mixed hyperlipidemia  Osteoarthritis ankle  Osteoarthritis knees  Osteoarthritis cervical/thoracic spine  Mild to moderate sensorineural hearing loss bilaterally without recommendation for hearing aids this time (07/17/2024)  MEDS  Aspirin 81 mg daily - was on hold for colonoscopy but instructed to resume until scheduled again  Lipitor 40 mg daily  Lisinopril 20 mg daily  Flonase-currently on hold  Gabapentin 100 mg q.a.m. and 300 mg HS  Alive Women's 50+ gummies  Effexor  mg daily     Health Maintenance:  Colon Cancer screening: due in 05/05/2024-patient has not been contacted  Breast cancer screening: Mammogram 02/01/2024 - BI-RADS 1 bilaterally; repeat in 1 year  Cervical Cancer Screen: 09/28/2021 ; repeat September 2024  Immunizations: UTD  Review of Systems   Constitutional:  Positive for chills and fatigue. Negative for activity change and unexpected weight change.   HENT:  Negative for hearing loss, rhinorrhea and trouble swallowing.    Eyes:  Negative for discharge and visual disturbance.   Respiratory:  Negative for chest tightness and  "wheezing.    Cardiovascular:  Negative for chest pain and palpitations.   Gastrointestinal:  Positive for diarrhea. Negative for blood in stool, constipation and vomiting.   Endocrine: Negative for polydipsia and polyuria.   Genitourinary:  Negative for difficulty urinating, dysuria, hematuria and menstrual problem.   Musculoskeletal:  Positive for neck pain. Negative for arthralgias and joint swelling.   Neurological:  Positive for weakness and headaches.   Psychiatric/Behavioral:  Negative for confusion and dysphoric mood.             Objective:      Vitals:    09/12/24 0956   BP: 129/79   Pulse: 81   Resp: 20   Temp: 98.1 °F (36.7 °C)   /79 (BP Location: Right arm, Patient Position: Sitting, BP Method: Large (Automatic))   Pulse 81   Temp 98.1 °F (36.7 °C) (Oral)   Resp 20   Ht 5' 5" (1.651 m)   Wt 71.1 kg (156 lb 12.8 oz)   SpO2 98%   BMI 26.09 kg/m²       Physical Exam  Constitutional:       General: She is not in acute distress.     Appearance: She is not ill-appearing, toxic-appearing or diaphoretic.   Cardiovascular:      Rate and Rhythm: Normal rate and regular rhythm.   Pulmonary:      Effort: Pulmonary effort is normal. No respiratory distress.      Breath sounds: No stridor. No wheezing, rhonchi or rales.   Neurological:      Mental Status: She is alert and oriented to person, place, and time.          Latest Reference Range & Units 08/13/24 16:01   Influenza A, Molecular Not Detected  Not Detected   Influenza B, Molecular Not Detected  Not Detected   SARS-CoV2 (COVID-19) Qualitative PCR Not Detected, Negative  Detected !      Latest Reference Range & Units 09/12/24 10:50   WBC 4.50 - 11.50 x10(3)/mcL 5.10   RBC 4.20 - 5.40 x10(6)/mcL 4.64   Hemoglobin 12.0 - 16.0 g/dL 14.0   Hematocrit 37.0 - 47.0 % 41.8   MCV 80.0 - 94.0 fL 90.1   MCH 27.0 - 31.0 pg 30.2   MCHC 33.0 - 36.0 g/dL 33.5   RDW 11.5 - 17.0 % 13.7   Platelet Count 130 - 400 x10(3)/mcL 311   MPV 7.4 - 10.4 fL 9.8   Neut % % 45.9 "   LYMPH % % 35.1   Mono % % 12.9   Eos % % 5.5   Basophil % % 0.4   Immature Granulocytes % 0.2   Neut # 2.1 - 9.2 x10(3)/mcL 2.34   Lymph # 0.6 - 4.6 x10(3)/mcL 1.79   Mono # 0.1 - 1.3 x10(3)/mcL 0.66   Eos # 0 - 0.9 x10(3)/mcL 0.28   Baso # <=0.2 x10(3)/mcL 0.02   Immature Grans (Abs) 0 - 0.04 x10(3)/mcL 0.01   nRBC % 0.0   Sodium 136 - 145 mmol/L 141   Potassium 3.5 - 5.1 mmol/L 4.8   Chloride 98 - 107 mmol/L 107   CO2 23 - 31 mmol/L 29   Anion Gap mEq/L 5.0   BUN 9.8 - 20.1 mg/dL 13.4   Creatinine 0.55 - 1.02 mg/dL 0.67   BUN/CREAT RATIO  20   eGFR mL/min/1.73/m2 >60   Glucose 82 - 115 mg/dL 52 (L)   Calcium 8.4 - 10.2 mg/dL 9.7   ALP 40 - 150 unit/L 73   PROTEIN TOTAL 5.8 - 7.6 gm/dL 6.9   Albumin 3.4 - 4.8 g/dL 3.7   Albumin/Globulin Ratio 1.1 - 2.0 ratio 1.2   BILIRUBIN TOTAL <=1.5 mg/dL 0.4   AST 5 - 34 unit/L 16   ALT 0 - 55 unit/L 11   Globulin, Total 2.4 - 3.5 gm/dL 3.2     Assessment/Plan:  Primary hypertension  Hypertension well-controlled with BP at goal  Continue benazepril 20 mg half tablet daily  Monitor BP at home  Continued efforts at healthy eating including diet rich in unprocessed fruits, vegetables and fish/lean meats.  Continued efforts at exercise  Mixed hyperlipidemia  Instructed to resume atorvastatin which was held when on Paxlovid  COVID-19  Mild residual fatigue  We will continue to monitor  Facial flushing  Fatigue, possibly postviral  CMP and CBC ordered and reviewed due to fatigue  Patient was notably hypoglycemic on CMP above; CBC was unremarkable  Patient instructed to eat small frequent meals with some protein, and to avoid highly concentrated sweets  Results reviewed with the patient on day of visit  We will continue to monitor for symptoms and further evaluate if persistent  Thyroid nodule  -     Ambulatory referral/consult to ENT; Future; Expected date: 09/19/2024  Need for colon cancer screening  Recommended patient contact gastroenterologist office to reschedule colonoscopy    Follow up in about 6 weeks (around 10/24/2024).

## 2024-09-13 RX ORDER — IPRATROPIUM BROMIDE 21 UG/1
SPRAY, METERED NASAL
Qty: 30 ML | Refills: 0 | Status: SHIPPED | OUTPATIENT
Start: 2024-09-13

## 2024-09-26 RX ORDER — MUPIROCIN 20 MG/G
1 OINTMENT TOPICAL 3 TIMES DAILY
Qty: 30 G | Refills: 0 | Status: SHIPPED | OUTPATIENT
Start: 2024-09-26 | End: 2024-10-06

## 2024-10-23 ENCOUNTER — OFFICE VISIT (OUTPATIENT)
Dept: FAMILY MEDICINE | Facility: CLINIC | Age: 69
End: 2024-10-23
Payer: MEDICARE

## 2024-10-23 VITALS
HEART RATE: 78 BPM | HEIGHT: 65 IN | WEIGHT: 156.63 LBS | RESPIRATION RATE: 20 BRPM | TEMPERATURE: 98 F | OXYGEN SATURATION: 100 % | DIASTOLIC BLOOD PRESSURE: 72 MMHG | SYSTOLIC BLOOD PRESSURE: 108 MMHG | BODY MASS INDEX: 26.1 KG/M2

## 2024-10-23 DIAGNOSIS — S80.01XA TRAUMATIC ECCHYMOSIS OF RIGHT KNEE, INITIAL ENCOUNTER: ICD-10-CM

## 2024-10-23 DIAGNOSIS — S40.012A TRAUMATIC ECCHYMOSIS OF LEFT SHOULDER, INITIAL ENCOUNTER: ICD-10-CM

## 2024-10-23 DIAGNOSIS — S80.02XA TRAUMATIC ECCHYMOSIS OF LEFT KNEE, INITIAL ENCOUNTER: ICD-10-CM
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
## 2024-10-23 PROCEDURE — 99215 OFFICE O/P EST HI 40 MIN: CPT | Mod: PBBFAC

## 2024-10-23 NOTE — PROGRESS NOTES
"Subjective:    Patient ID: Jennie Julio is a 68 y.o. female  who presented to [facility].    Chief Complaint: Bilateral knee pain and left shoulder pain      History of Present Illness:  Jennie Julio presented today with with a knee injury involving both knees for the past 3 days. The patient had a mechanical fall, DOI: 10/20/24, where she fell on her knees L>R as well as left arm/shoulder. She denies any LOC or hitting her head. She is more concerned of her knee pain vs shoulder pain. She ranks her knee pain 6 out of 10 and shoulder pain 6 out of 10. Knee pain is located at the patella and deep within the knee. Quality of pain is described as Sharp and Aching.  Inciting event: mechanical fall. Pain is aggravated by any weight bearing and walking.  Patient has had prior knee problems. Evaluation to date: none. Treatment to date: avoidance of activity, oral analgesics (2 tabs advil q8h), and ice/heat.    Knee Review of Systems:  Swelling?  yes  Instability?  no  Mechanical sx?  no  <30 min AM stiffness? yes  Limited ROM? yes  Fever/Chills? no       Objective:      Physical Exam:    /72 (BP Location: Right arm, Patient Position: Sitting)   Pulse 78   Temp 97.8 °F (36.6 °C) (Oral)   Resp 20   Ht 5' 5" (1.651 m)   Wt 71 kg (156 lb 9.6 oz)   SpO2 100%   BMI 26.06 kg/m²     Knee Appearance:  limping  FWB  Alignment: Left: normal Right: normal   Soft tissue swelling: Left: yes Right: no  Effusion: Left:  Negative Right: Negative  Erythema: Left no Right: no  Ecchymosis: Left: yes Right: yes  Atrophy: Left: no Right: no    Palpation:  Knee Tenderness: Left: nonspecific generalized tenderness Right: nonspecific generalized tenderness    Range of motion:  Flexion (140): Left:  120 Right: 130  Extension (0): Left: 0 Right: 0    Strength:  Extension: Left 5/5  Pain: yes     Right 5/5 Pain: yes  Flexion: Left 5/5 Pain: yes Right   5/5 Pain: yes    Special Tests:  Ballotable Effusion:Left: " Negative Right: Negative   Fluid Wave: Left: Negative Right: Negative   Crepitus: Left: Positive Right: Positive   Patellar grind test: Left: Negative  Right: Negative  Apprehension test: Left: Negative Right: Negative   Varus: @ 0, Left Negative Right: Negative.  @ 30, Left Negative  Right Negative   Valgus: @ 0, Left Negative Right: Negative.  @ 30, Left Negative  Right Negative  Lachman: Left: Negative Right: Negative   Ant Drawer: Left: Negative Right: Negative   Posterior Drawer: Left: Negative Right: Negative   Dial Test: Left: Not performed Right: Not performed   Karina: Left: Negative Right: Negative   Apley's: Left: Not performed Right: Not performed  Thessaly's: Left: Not performed Right: Not performed   Noble Compression: Left: Not performed Right: Not performed   Fadi: Left: Not performed Right: Not performed     Shoulder Appearance:  Soft tissue swelling: Left: no Right: no  Effusion: Left:  Negative Right: Negative  Erythema: Left no Right: no  Ecchymosis: Left: yes Right: no  Atrophy: Left: no Right: no  Scapular winging: Left: no Right: no    Palpation:  Shoulder Tenderness: Left: biceps tendon, supraspinatus  Right: none    Range of motion:  Flexion (0-90): Left:  90 Right: 90  Abduction (0-180): Left:  180 Right: 180  External rotation (0-55): Left: 55 Right: 55  Internal rotation (0-45): Left: 45 Right: 45    Strength:  Abduction: Left: 5/5 Pain: yes Right: 5/5 Pain: no  External rotation: Left: 5/5 Pain: no Right: 5/5 Pain: no  Internal rotation: Left: 5/5 Pain: no Right: 5/5 Pain: no  Elbow flexion: Left: 5/5 Pain: no Right: 5/5 Pain: no  Elbow extension: Left: 5/5 Pain: no Right: 5/5 Pain: no    General appearance: NAD  Peripheral pulses: normal bilaterally   Reflexes: Left: normal Right normal   Sensation: normal    Labs:  Last A1c: The patient doesn't have any registry metric data available         Assessment:        Encounter Diagnoses   Code Name Primary?    W19.XXXA Fall, accidental,  initial encounter Yes    S80.02XA Traumatic ecchymosis of left knee, initial encounter     S80.01XA Traumatic ecchymosis of right knee, initial encounter     S40.012A Traumatic ecchymosis of left shoulder, initial encounter         Plan:      Dx: mechanical fall resulting in ecchymosis and swelling bilateral knees and left shoulder - new problem  Treatment Plan: Discussed with patient diagnosis and treatment recommendations. Education provided. Recommend conservative treatment to include: avoidance of aggravating activity, significant modification of daily activities, hot/cold therapies, topical and oral medications, braces. Patient is 3 days from injury, discussed obtaining a XR if continued pain in a week.   Activity: Activity as tolerated; HEP to include aerobic conditioning and strength training with non-painful activity. ROM/STG exercises. Proper footware; assistive devises to avoid limping.   Medication: CONTINUE over-the-counter acetaminophen (Tylenol 1000 mg three times per day as needed)  CONTINUE over-the-counter NSAIDs (ibuprofen 200mg three tablets three times a day as needed). Please see your primary care physician for further refills.  RTC: Follow up with Dr. Montano at already scheduled visit.           This note is dictated using the M*Modal Fluency Direct word recognition program. There are word recognition mistakes that are occasionally missed on review.     Christin Newell MD  Sports Medicine Fellow

## 2024-10-24 NOTE — PROGRESS NOTES
Faculty Attestation: Jennie Julio  was seen in [facility]. Discussed with Dr. Newell at the time of the visit. History of Present Illness, Physical Exam, and Assessment and Plan reviewed. Treatment plan is reasonable and appropriate. Compliance with treatment recommendations is important.  No imaging studies were done today. No procedure was performed.     Megan Lopez MD  Family Medicine

## 2024-10-30 ENCOUNTER — HOSPITAL ENCOUNTER (OUTPATIENT)
Dept: RADIOLOGY | Facility: HOSPITAL | Age: 69
Discharge: HOME | End: 2024-10-30
Attending: FAMILY MEDICINE
Payer: MEDICARE

## 2024-10-30 DIAGNOSIS — M17.12 PRIMARY OSTEOARTHRITIS OF LEFT KNEE: ICD-10-CM

## 2024-10-30 DIAGNOSIS — M25.561 ACUTE PAIN OF BOTH KNEES: ICD-10-CM

## 2024-10-30 DIAGNOSIS — M25.562 ACUTE PAIN OF BOTH KNEES: ICD-10-CM

## 2024-10-30 PROCEDURE — 73564 X-RAY EXAM KNEE 4 OR MORE: CPT | Mod: TC,LT

## 2024-11-06 ENCOUNTER — TELEPHONE (OUTPATIENT)
Dept: FAMILY MEDICINE | Facility: CLINIC | Age: 69
End: 2024-11-06
Payer: MEDICARE

## 2024-11-06 DIAGNOSIS — E04.1 THYROID NODULE: Primary | ICD-10-CM

## 2024-11-27 ENCOUNTER — OFFICE VISIT (OUTPATIENT)
Dept: FAMILY MEDICINE | Facility: CLINIC | Age: 69
End: 2024-11-27
Payer: MEDICARE

## 2024-11-27 VITALS
WEIGHT: 154.81 LBS | HEIGHT: 65 IN | OXYGEN SATURATION: 100 % | BODY MASS INDEX: 25.79 KG/M2 | TEMPERATURE: 98 F | DIASTOLIC BLOOD PRESSURE: 85 MMHG | SYSTOLIC BLOOD PRESSURE: 137 MMHG | HEART RATE: 76 BPM | RESPIRATION RATE: 20 BRPM

## 2024-11-27 DIAGNOSIS — R23.2 HOT FLASHES: ICD-10-CM

## 2024-11-27 DIAGNOSIS — I10 PRIMARY HYPERTENSION: ICD-10-CM

## 2024-11-27 DIAGNOSIS — F41.9 CHRONIC ANXIETY: Primary | ICD-10-CM

## 2024-11-27 DIAGNOSIS — M54.41 CHRONIC BILATERAL LOW BACK PAIN WITH RIGHT-SIDED SCIATICA: ICD-10-CM

## 2024-11-27 DIAGNOSIS — E04.1 THYROID NODULE: ICD-10-CM

## 2024-11-27 DIAGNOSIS — G89.29 CHRONIC BILATERAL LOW BACK PAIN WITH RIGHT-SIDED SCIATICA: ICD-10-CM

## 2024-11-27 DIAGNOSIS — M17.12 PRIMARY OSTEOARTHRITIS OF LEFT KNEE: ICD-10-CM

## 2024-11-27 LAB — TSH SERPL-ACNC: 1.93 UIU/ML (ref 0.35–4.94)

## 2024-11-27 PROCEDURE — 36415 COLL VENOUS BLD VENIPUNCTURE: CPT | Performed by: FAMILY MEDICINE

## 2024-11-27 PROCEDURE — 86376 MICROSOMAL ANTIBODY EACH: CPT | Performed by: FAMILY MEDICINE

## 2024-11-27 PROCEDURE — 84443 ASSAY THYROID STIM HORMONE: CPT | Performed by: FAMILY MEDICINE

## 2024-11-27 RX ORDER — GABAPENTIN 300 MG/1
300 CAPSULE ORAL 3 TIMES DAILY
Qty: 90 CAPSULE | Refills: 2 | Status: SHIPPED | OUTPATIENT
Start: 2024-11-27

## 2024-11-27 NOTE — PROGRESS NOTES
Subjective:       Patient ID: Jennie Julio is a 68 y.o. female.    Chief Complaint: Follow-up (Multiple medical conditions)    HPI  Having significant perimenopausal symptoms  Hot flashes/ night sweats  Emotional and having issues dealing with sister who has demential and loves with her   Review of Systems         Objective:      Vitals:    11/27/24 0816   BP: 137/85   Pulse: 76   Resp: 20   Temp: 97.9 °F (36.6 °C)       Physical Exam      Assessment/Plan:  Chronic anxiety    Primary hypertension    Chronic bilateral low back pain with right-sided sciatica    Primary osteoarthritis of left knee    Hot flashes  -     gabapentin (NEURONTIN) 300 MG capsule; Take 1 capsule (300 mg total) by mouth 3 (three) times daily.  Dispense: 90 capsule; Refill: 2    Thyroid nodule  -     Cancel: THYROID PEROXIDASE (TPO); Future; Expected date: 11/27/2024  -     Cancel: TSH; Future; Expected date: 11/27/2024  -     THYROID PEROXIDASE (TPO); Future; Expected date: 11/27/2024  -     TSH; Future; Expected date: 11/27/2024    Hospice counseling      Follow up in about 12 days (around 12/9/2024).

## 2024-12-02 LAB — THYROID PEROXIDASE QUANT (OLG): 25 IU/ML

## 2025-01-09 DIAGNOSIS — F41.9 CHRONIC ANXIETY: ICD-10-CM

## 2025-01-10 RX ORDER — VENLAFAXINE HYDROCHLORIDE 150 MG/1
CAPSULE, EXTENDED RELEASE ORAL
Qty: 30 CAPSULE | Refills: 2 | Status: SHIPPED | OUTPATIENT
Start: 2025-01-10

## 2025-02-06 ENCOUNTER — HOSPITAL ENCOUNTER (OUTPATIENT)
Dept: RADIOLOGY | Facility: HOSPITAL | Age: 70
Discharge: HOME | End: 2025-02-06
Attending: FAMILY MEDICINE
Payer: MEDICARE

## 2025-02-06 DIAGNOSIS — Z12.31 ENCOUNTER FOR SCREENING MAMMOGRAM FOR MALIGNANT NEOPLASM OF BREAST: ICD-10-CM

## 2025-02-06 PROCEDURE — 77063 BREAST TOMOSYNTHESIS BI: CPT | Mod: 26,,, | Performed by: RADIOLOGY

## 2025-02-06 PROCEDURE — 77067 SCR MAMMO BI INCL CAD: CPT | Mod: 26,,, | Performed by: RADIOLOGY

## 2025-02-06 PROCEDURE — 77067 SCR MAMMO BI INCL CAD: CPT | Mod: TC

## 2025-02-12 ENCOUNTER — HOSPITAL ENCOUNTER (OUTPATIENT)
Dept: RADIOLOGY | Facility: HOSPITAL | Age: 70
Discharge: HOME | End: 2025-02-12
Attending: FAMILY MEDICINE
Payer: MEDICARE

## 2025-02-12 DIAGNOSIS — R92.8 ABNORMAL MAMMOGRAPHY: ICD-10-CM

## 2025-02-12 DIAGNOSIS — R92.8 ABNORMAL MAMMOGRAM: ICD-10-CM

## 2025-02-12 PROCEDURE — 77061 BREAST TOMOSYNTHESIS UNI: CPT | Mod: 26,RT,, | Performed by: STUDENT IN AN ORGANIZED HEALTH CARE EDUCATION/TRAINING PROGRAM

## 2025-02-12 PROCEDURE — 77065 DX MAMMO INCL CAD UNI: CPT | Mod: 26,RT,, | Performed by: STUDENT IN AN ORGANIZED HEALTH CARE EDUCATION/TRAINING PROGRAM

## 2025-02-12 PROCEDURE — 77061 BREAST TOMOSYNTHESIS UNI: CPT | Mod: TC,RT

## 2025-02-14 ENCOUNTER — OFFICE VISIT (OUTPATIENT)
Dept: FAMILY MEDICINE | Facility: CLINIC | Age: 70
End: 2025-02-14
Payer: MEDICARE

## 2025-02-14 VITALS
SYSTOLIC BLOOD PRESSURE: 130 MMHG | OXYGEN SATURATION: 100 % | DIASTOLIC BLOOD PRESSURE: 83 MMHG | RESPIRATION RATE: 20 BRPM | HEIGHT: 65 IN | HEART RATE: 84 BPM | WEIGHT: 153.19 LBS | BODY MASS INDEX: 25.52 KG/M2 | TEMPERATURE: 98 F

## 2025-02-14 DIAGNOSIS — J06.9 UPPER RESPIRATORY TRACT INFECTION, UNSPECIFIED TYPE: Primary | ICD-10-CM

## 2025-02-14 PROCEDURE — 0241U COVID/RSV/FLU A&B PCR: CPT

## 2025-02-14 PROCEDURE — 99214 OFFICE O/P EST MOD 30 MIN: CPT | Mod: PBBFAC

## 2025-02-14 RX ORDER — BENZONATATE 200 MG/1
200 CAPSULE ORAL 3 TIMES DAILY PRN
Qty: 30 CAPSULE | Refills: 0 | Status: SHIPPED | OUTPATIENT
Start: 2025-02-14 | End: 2025-02-24

## 2025-02-14 NOTE — PROGRESS NOTES
[facility] Progress Note    ID:  Jennie Julio   MRN:  [id number]     2/14/2025    Chief Complaint:    Chief Complaint   Patient presents with    Diarrhea     Onset since yesterday. C/o of nasal congestion, dry cough, swollen glands.      History of Present Illness:  Jennie Julio is a 69 y.o. female who presents to [facility] for     URI Symptoms:  - onset yesterday  - associated symptoms: chills, sore throat, nasal congestion   - denies fever, shortness of breath, chest pain, back pain, dysuria  - exposure to sick contact, boyfriend with similar symptoms     Current Outpatient Medications   Medication Instructions    aspirin (ECOTRIN) 81 mg, Oral, Every other day    atorvastatin (LIPITOR) 40 mg, Oral, Daily    benazepriL (LOTENSIN) 10 mg, Oral, Daily    benzonatate (TESSALON) 200 mg, Oral, 3 times daily PRN    cetirizine (ZYRTEC) 10 mg, Oral, Daily    coenzyme Q10 100 mg Chew Oral    diclofenac sodium (VOLTAREN) 2 g, Topical (Top), 3 times daily    fluticasone propionate (FLONASE) 100 mcg, Each Nostril, Daily    gabapentin (NEURONTIN) 300 mg, Oral, 3 times daily    mv-mn-folic-lutein-herbal 293 (ALIVE WOMEN'S 50 PLUS GUMMY) 120 mcg-150 mcg -37.5 mg Chew Oral    psyllium husk, with sugar, 2.5 gram Wafr Daily    venlafaxine (EFFEXOR-XR) 150 MG Cp24 TAKE 1 CAPSULE(150 MG) BY MOUTH DAILY       Review of Systems:  Pertinent positives and negatives as mentioned in HPI    Objective:    Vitals:    02/14/25 0916   BP: 130/83   Pulse: 84   Resp: 20   Temp: 97.6 °F (36.4 °C)       Physical Exam  Vitals reviewed.   HENT:      Right Ear: External ear normal.      Left Ear: External ear normal.      Nose: No congestion or rhinorrhea.      Mouth/Throat:      Mouth: Mucous membranes are moist.      Pharynx: Oropharynx is clear.   Eyes:      General:         Right eye: No discharge.         Left eye: No discharge.      Extraocular Movements: Extraocular movements intact.      Conjunctiva/sclera: Conjunctivae normal.   Cardiovascular:       Rate and Rhythm: Normal rate and regular rhythm.      Heart sounds: No murmur heard.  Pulmonary:      Effort: Pulmonary effort is normal. No respiratory distress.      Breath sounds: No stridor. No wheezing, rhonchi or rales.   Abdominal:      General: Abdomen is flat. Bowel sounds are normal. There is no distension.      Palpations: Abdomen is soft.      Tenderness: There is no abdominal tenderness.   Musculoskeletal:      Cervical back: Neck supple.   Skin:     General: Skin is warm and dry.      Coloration: Skin is not jaundiced or pale.      Findings: No bruising.   Neurological:      Mental Status: She is alert and oriented to person, place, and time.   Psychiatric:         Behavior: Behavior normal.           Assessment/Plan:  Jennie was seen today for diarrhea.    Diagnoses and all orders for this visit:    Upper respiratory tract infection, unspecified type  -     COVID/RSV/FLU A&B PCR  -     Discussed with patient OTC medications including DayQuil/NyQuil or Delsym for symptomatic control unless previously been advised by provider to avoid or intolerant in the past  -     Drink plenty of water; Tylenol for myalgias; Flonase for rhinorrhea; tessalon pearls for cough symptoms   -     Voiced understanding of risks including but not limited to drowsiness, increased HTN.  -     Return precautions discussed including facial tenderness/pain, symptoms worsening, or symptoms lasting more than 10 days.   -     Educational handout provided       Follow up if symptoms worsen or fail to improve.    Maliha Wick MD  [facility], [facility]

## 2025-02-15 NOTE — PROGRESS NOTES
Discussed with resident at time of encounter 02-14-25.  URI symptoms.      Resident's note reviewed 02-15-25.  Agree with assessment; plan of care appropriate.  Re-evaluate if persistent / worsening symptoms.  Professional services provided in an outpatient primary care center affiliated with a [facility] institution.

## 2025-02-26 ENCOUNTER — HOSPITAL ENCOUNTER (OUTPATIENT)
Dept: RADIOLOGY | Facility: HOSPITAL | Age: 70
Discharge: HOME | End: 2025-02-26
Attending: FAMILY MEDICINE
Payer: MEDICARE

## 2025-02-26 DIAGNOSIS — R23.2 HOT FLASHES: ICD-10-CM

## 2025-02-26 PROCEDURE — 73140 X-RAY EXAM OF FINGER(S): CPT | Mod: TC,RT

## 2025-02-26 RX ORDER — GABAPENTIN 300 MG/1
300 CAPSULE ORAL 3 TIMES DAILY
Qty: 90 CAPSULE | Refills: 2 | Status: SHIPPED | OUTPATIENT
Start: 2025-02-26

## 2025-03-17 ENCOUNTER — DOCUMENTATION ONLY (OUTPATIENT)
Dept: FAMILY MEDICINE | Facility: CLINIC | Age: 70
End: 2025-03-17
Payer: MEDICARE

## 2025-03-17 PROBLEM — M65.331 TRIGGER MIDDLE FINGER OF RIGHT HAND: Status: ACTIVE | Noted: 2025-03-17

## 2025-03-17 PROBLEM — K63.5 POLYP OF COLON: Status: ACTIVE | Noted: 2025-03-06

## 2025-03-17 PROBLEM — Z86.0101 PERSONAL HISTORY OF ADENOMATOUS AND SERRATED COLON POLYPS: Status: ACTIVE | Noted: 2024-06-25

## 2025-03-17 PROBLEM — M85.89 OSTEOPENIA OF MULTIPLE SITES: Status: ACTIVE | Noted: 2025-03-17

## 2025-03-17 PROBLEM — K64.8 OTHER HEMORRHOIDS: Status: ACTIVE | Noted: 2025-03-06

## 2025-03-17 PROBLEM — E04.2 MULTIPLE THYROID NODULES: Status: ACTIVE | Noted: 2025-03-17

## 2025-03-17 PROBLEM — D12.2 BENIGN NEOPLASM OF ASCENDING COLON: Status: ACTIVE | Noted: 2025-03-06

## 2025-03-17 LAB — CRC RECOMMENDATION EXT: NORMAL

## 2025-03-31 DIAGNOSIS — F41.9 CHRONIC ANXIETY: ICD-10-CM

## 2025-03-31 DIAGNOSIS — I10 PRIMARY HYPERTENSION: Primary | ICD-10-CM

## 2025-03-31 DIAGNOSIS — R23.2 HOT FLASHES: ICD-10-CM

## 2025-03-31 RX ORDER — GABAPENTIN 300 MG/1
300 CAPSULE ORAL 3 TIMES DAILY
Qty: 42 CAPSULE | Refills: 0 | Status: SHIPPED | OUTPATIENT
Start: 2025-03-31 | End: 2026-03-31

## 2025-03-31 RX ORDER — BENAZEPRIL HYDROCHLORIDE 20 MG/1
20 TABLET ORAL DAILY
Qty: 14 TABLET | Refills: 0 | Status: SHIPPED | OUTPATIENT
Start: 2025-03-31 | End: 2025-04-14

## 2025-03-31 RX ORDER — VENLAFAXINE HYDROCHLORIDE 150 MG/1
150 CAPSULE, EXTENDED RELEASE ORAL DAILY
Qty: 14 CAPSULE | Refills: 0 | Status: SHIPPED | OUTPATIENT
Start: 2025-03-31 | End: 2025-04-14

## 2025-04-11 ENCOUNTER — HOSPITAL ENCOUNTER (OUTPATIENT)
Dept: RADIOLOGY | Facility: HOSPITAL | Age: 70
Discharge: HOME | End: 2025-04-11
Attending: FAMILY MEDICINE
Payer: MEDICARE

## 2025-04-11 DIAGNOSIS — M85.89 OSTEOPENIA OF MULTIPLE SITES: ICD-10-CM

## 2025-04-11 PROCEDURE — 77080 DXA BONE DENSITY AXIAL: CPT | Mod: TC

## 2025-04-14 RX ORDER — GABAPENTIN 300 MG/1
300 CAPSULE ORAL 3 TIMES DAILY
Qty: 42 CAPSULE | Refills: 0 | OUTPATIENT
Start: 2025-04-14 | End: 2026-04-14

## 2025-04-15 NOTE — PROGRESS NOTES
Patient requested certain medications be sent to the pharmacy in [place] as she left them at home to go to a  and does not have access to them and she is about to run out.  A 2 week supply of each medication was sent.

## 2025-04-30 ENCOUNTER — TELEPHONE (OUTPATIENT)
Dept: FAMILY MEDICINE | Facility: CLINIC | Age: 70
End: 2025-04-30

## 2025-04-30 ENCOUNTER — OFFICE VISIT (OUTPATIENT)
Dept: FAMILY MEDICINE | Facility: CLINIC | Age: 70
End: 2025-04-30
Payer: MEDICARE

## 2025-04-30 VITALS
BODY MASS INDEX: 25.39 KG/M2 | HEIGHT: 65 IN | DIASTOLIC BLOOD PRESSURE: 69 MMHG | WEIGHT: 152.38 LBS | SYSTOLIC BLOOD PRESSURE: 109 MMHG | HEART RATE: 73 BPM | TEMPERATURE: 97 F | OXYGEN SATURATION: 97 %

## 2025-04-30 DIAGNOSIS — M54.41 CHRONIC BILATERAL LOW BACK PAIN WITH RIGHT-SIDED SCIATICA: ICD-10-CM

## 2025-04-30 DIAGNOSIS — G89.29 CHRONIC BILATERAL LOW BACK PAIN WITH RIGHT-SIDED SCIATICA: ICD-10-CM

## 2025-04-30 DIAGNOSIS — Z13.1 SCREENING FOR DIABETES MELLITUS: ICD-10-CM

## 2025-04-30 DIAGNOSIS — I10 PRIMARY HYPERTENSION: ICD-10-CM

## 2025-04-30 DIAGNOSIS — E16.2 HYPOGLYCEMIA: ICD-10-CM

## 2025-04-30 DIAGNOSIS — F32.A MILD DEPRESSION: ICD-10-CM

## 2025-04-30 DIAGNOSIS — R23.3 EASY BRUISING: ICD-10-CM

## 2025-04-30 DIAGNOSIS — E78.2 MIXED HYPERLIPIDEMIA: ICD-10-CM

## 2025-04-30 DIAGNOSIS — E04.2 MULTIPLE THYROID NODULES: ICD-10-CM

## 2025-04-30 DIAGNOSIS — F41.9 CHRONIC ANXIETY: Primary | ICD-10-CM

## 2025-04-30 LAB
ALBUMIN SERPL-MCNC: 3.6 G/DL (ref 3.4–4.8)
ALBUMIN/GLOB SERPL: 1.1 RATIO (ref 1.1–2)
ALP SERPL-CCNC: 69 UNIT/L (ref 40–150)
ALT SERPL-CCNC: 11 UNIT/L (ref 0–55)
ANION GAP SERPL CALC-SCNC: 9 MEQ/L
AST SERPL-CCNC: 17 UNIT/L (ref 11–45)
BASOPHILS # BLD AUTO: 0.03 X10(3)/MCL
BASOPHILS NFR BLD AUTO: 0.6 %
BILIRUB SERPL-MCNC: 0.4 MG/DL
BUN SERPL-MCNC: 16.5 MG/DL (ref 9.8–20.1)
CALCIUM SERPL-MCNC: 9.3 MG/DL (ref 8.4–10.2)
CHLORIDE SERPL-SCNC: 108 MMOL/L (ref 98–107)
CHOLEST SERPL-MCNC: 168 MG/DL
CHOLEST/HDLC SERPL: 3 {RATIO} (ref 0–5)
CO2 SERPL-SCNC: 26 MMOL/L (ref 23–31)
CREAT SERPL-MCNC: 0.68 MG/DL (ref 0.55–1.02)
CREAT/UREA NIT SERPL: 24
EOSINOPHIL # BLD AUTO: 0.25 X10(3)/MCL (ref 0–0.9)
EOSINOPHIL NFR BLD AUTO: 5 %
ERYTHROCYTE [DISTWIDTH] IN BLOOD BY AUTOMATED COUNT: 13.5 % (ref 11.5–17)
EST. AVERAGE GLUCOSE BLD GHB EST-MCNC: 108.3 MG/DL
GFR SERPLBLD CREATININE-BSD FMLA CKD-EPI: >60 ML/MIN/1.73/M2
GLOBULIN SER-MCNC: 3.4 GM/DL (ref 2.4–3.5)
GLUCOSE SERPL-MCNC: 46 MG/DL (ref 82–115)
HBA1C MFR BLD: 5.4 %
HCT VFR BLD AUTO: 44.1 % (ref 37–47)
HDLC SERPL-MCNC: 66 MG/DL (ref 35–60)
HGB BLD-MCNC: 14.2 G/DL (ref 12–16)
IMM GRANULOCYTES # BLD AUTO: 0.01 X10(3)/MCL (ref 0–0.04)
IMM GRANULOCYTES NFR BLD AUTO: 0.2 %
LDLC SERPL CALC-MCNC: 88 MG/DL (ref 50–140)
LYMPHOCYTES # BLD AUTO: 1.68 X10(3)/MCL (ref 0.6–4.6)
LYMPHOCYTES NFR BLD AUTO: 33.3 %
MCH RBC QN AUTO: 29.9 PG (ref 27–31)
MCHC RBC AUTO-ENTMCNC: 32.2 G/DL (ref 33–36)
MCV RBC AUTO: 92.8 FL (ref 80–94)
MONOCYTES # BLD AUTO: 0.67 X10(3)/MCL (ref 0.1–1.3)
MONOCYTES NFR BLD AUTO: 13.3 %
NEUTROPHILS # BLD AUTO: 2.41 X10(3)/MCL (ref 2.1–9.2)
NEUTROPHILS NFR BLD AUTO: 47.6 %
NRBC BLD AUTO-RTO: 0 %
PLATELET # BLD AUTO: 331 X10(3)/MCL (ref 130–400)
PMV BLD AUTO: 9.7 FL (ref 7.4–10.4)
POTASSIUM SERPL-SCNC: 3.7 MMOL/L (ref 3.5–5.1)
PROT SERPL-MCNC: 7 GM/DL (ref 5.8–7.6)
RBC # BLD AUTO: 4.75 X10(6)/MCL (ref 4.2–5.4)
SODIUM SERPL-SCNC: 143 MMOL/L (ref 136–145)
TRIGL SERPL-MCNC: 69 MG/DL (ref 37–140)
VLDLC SERPL CALC-MCNC: 14 MG/DL
WBC # BLD AUTO: 5.05 X10(3)/MCL (ref 4.5–11.5)

## 2025-04-30 PROCEDURE — 80053 COMPREHEN METABOLIC PANEL: CPT | Performed by: FAMILY MEDICINE

## 2025-04-30 PROCEDURE — 83036 HEMOGLOBIN GLYCOSYLATED A1C: CPT | Performed by: FAMILY MEDICINE

## 2025-04-30 PROCEDURE — 85025 COMPLETE CBC W/AUTO DIFF WBC: CPT | Performed by: FAMILY MEDICINE

## 2025-04-30 PROCEDURE — 80061 LIPID PANEL: CPT | Performed by: FAMILY MEDICINE

## 2025-04-30 RX ORDER — ASPIRIN 81 MG/1
81 TABLET ORAL EVERY OTHER DAY
Qty: 90 TABLET | Refills: 1 | Status: SHIPPED | OUTPATIENT
Start: 2025-04-30

## 2025-04-30 RX ORDER — ATORVASTATIN CALCIUM 40 MG/1
40 TABLET, FILM COATED ORAL DAILY
Qty: 90 TABLET | Refills: 3 | Status: SHIPPED | OUTPATIENT
Start: 2025-04-30

## 2025-04-30 NOTE — PROGRESS NOTES
"Subjective:       Patient ID: Jennie Julio is a 69 y.o. female.    Chief Complaint: Follow-up (Discussing imaging results, redness to L hand)    Follow-up  Associated symptoms include joint swelling and neck pain. Pertinent negatives include no arthralgias, chest pain, headaches, vomiting or weakness.     Hypertension - well controlled    Colonoscopy 3/6/2025 Polyp Repeat 5 years       Easy bruising left hand   With excoriation related petechia  Review of Systems   Constitutional:  Positive for unexpected weight change. Negative for activity change.   HENT:  Positive for hearing loss. Negative for rhinorrhea and trouble swallowing.    Eyes:  Negative for discharge and visual disturbance.   Respiratory:  Negative for chest tightness and wheezing.    Cardiovascular:  Negative for chest pain and palpitations.   Gastrointestinal:  Negative for blood in stool, constipation, diarrhea and vomiting.   Endocrine: Negative for polydipsia and polyuria.   Genitourinary:  Negative for difficulty urinating, dysuria, hematuria and menstrual problem.   Musculoskeletal:  Positive for joint swelling and neck pain. Negative for arthralgias.   Neurological:  Negative for weakness and headaches.   Psychiatric/Behavioral:  Negative for confusion and dysphoric mood.             Objective:      Vitals:    04/30/25 0726   BP: 109/69   Pulse: 73   Temp: 97.4 °F (36.3 °C)   /69 (BP Location: Right arm, Patient Position: Sitting)   Pulse 73   Temp 97.4 °F (36.3 °C) (Oral)   Ht 5' 5" (1.651 m)   Wt 69.1 kg (152 lb 6.4 oz)   SpO2 97%   BMI 25.36 kg/m²       Physical Exam      Assessment/Plan:  Chronic anxiety    Mild depression    Chronic bilateral low back pain with right-sided sciatica    Primary hypertension    Multiple thyroid nodules    Screening for diabetes mellitus  -     Hemoglobin A1C; Future; Expected date: 04/30/2025    Hypoglycemia  -     Hemoglobin A1C; Future; Expected date: 04/30/2025    Mixed hyperlipidemia  -     " atorvastatin (LIPITOR) 40 MG tablet; Take 1 tablet (40 mg total) by mouth once daily.  Dispense: 90 tablet; Refill: 3  -     Lipid Panel; Future; Expected date: 04/30/2025  -     Comprehensive Metabolic Panel; Future; Expected date: 04/30/2025    Easy bruising  -     CBC Auto Differential; Future; Expected date: 07/30/2025    Other orders  -     aspirin (ECOTRIN) 81 MG EC tablet; Take 1 tablet (81 mg total) by mouth every other day.  Dispense: 90 tablet; Refill: 1         Follow up in about 2 months (around 6/30/2025).

## 2025-06-06 RX ORDER — VENLAFAXINE HYDROCHLORIDE 75 MG/1
75 CAPSULE, EXTENDED RELEASE ORAL DAILY
Qty: 30 CAPSULE | Refills: 11 | Status: SHIPPED | OUTPATIENT
Start: 2025-06-06 | End: 2026-06-06

## 2025-06-30 ENCOUNTER — OFFICE VISIT (OUTPATIENT)
Dept: FAMILY MEDICINE | Facility: CLINIC | Age: 70
End: 2025-06-30
Payer: MEDICARE

## 2025-06-30 VITALS
HEIGHT: 65 IN | TEMPERATURE: 98 F | HEART RATE: 91 BPM | SYSTOLIC BLOOD PRESSURE: 118 MMHG | DIASTOLIC BLOOD PRESSURE: 75 MMHG | BODY MASS INDEX: 25.19 KG/M2 | WEIGHT: 151.19 LBS | OXYGEN SATURATION: 97 %

## 2025-06-30 DIAGNOSIS — M17.0 PRIMARY OSTEOARTHRITIS OF BOTH KNEES: ICD-10-CM

## 2025-06-30 DIAGNOSIS — I10 PRIMARY HYPERTENSION: ICD-10-CM

## 2025-06-30 DIAGNOSIS — M85.859 OSTEOPENIA OF HIP, UNSPECIFIED LATERALITY: ICD-10-CM

## 2025-06-30 DIAGNOSIS — E04.2 MULTIPLE THYROID NODULES: ICD-10-CM

## 2025-06-30 DIAGNOSIS — F32.A MILD DEPRESSION: ICD-10-CM

## 2025-06-30 DIAGNOSIS — M54.41 CHRONIC BILATERAL LOW BACK PAIN WITH RIGHT-SIDED SCIATICA: ICD-10-CM

## 2025-06-30 DIAGNOSIS — M85.89 OSTEOPENIA OF MULTIPLE SITES: ICD-10-CM

## 2025-06-30 DIAGNOSIS — R41.3 MEMORY LOSS, SHORT TERM: ICD-10-CM

## 2025-06-30 DIAGNOSIS — G89.29 CHRONIC BILATERAL LOW BACK PAIN WITH RIGHT-SIDED SCIATICA: ICD-10-CM

## 2025-06-30 DIAGNOSIS — F41.9 CHRONIC ANXIETY: Primary | ICD-10-CM

## 2025-06-30 DIAGNOSIS — R23.2 HOT FLASHES: ICD-10-CM

## 2025-06-30 PROCEDURE — 99214 OFFICE O/P EST MOD 30 MIN: CPT | Mod: PBBFAC | Performed by: FAMILY MEDICINE

## 2025-06-30 RX ORDER — GABAPENTIN 300 MG/1
300 CAPSULE ORAL 3 TIMES DAILY
Qty: 90 CAPSULE | Refills: 2 | Status: SHIPPED | OUTPATIENT
Start: 2025-06-30

## 2025-06-30 RX ORDER — VENLAFAXINE HYDROCHLORIDE 75 MG/1
75 CAPSULE, EXTENDED RELEASE ORAL DAILY
Qty: 30 CAPSULE | Refills: 2 | Status: SHIPPED | OUTPATIENT
Start: 2025-06-30 | End: 2025-09-28

## 2025-06-30 RX ORDER — VENLAFAXINE HYDROCHLORIDE 150 MG/1
150 CAPSULE, EXTENDED RELEASE ORAL DAILY
Qty: 30 CAPSULE | Refills: 2 | Status: SHIPPED | OUTPATIENT
Start: 2025-06-30

## 2025-06-30 NOTE — PROGRESS NOTES
Subjective:       Patient ID: Jennie Julio is a 69 y.o. female.    Chief Complaint: Follow-up (Chronic anxiety)    Follow-up  Pertinent negatives include no abdominal pain, chest pain, fever, headaches, numbness or weakness.   Back Pain  This is a recurrent problem. The current episode started in the past 7 days. The problem occurs 2 to 4 times per day. The problem has been waxing and waning since onset. The pain is present in the gluteal and sacro-iliac. The quality of the pain is described as aching and shooting. The pain radiates to the left knee and left thigh. The pain is moderate. The pain is Worse during the day. The symptoms are aggravated by bending, coughing, position, lying down, sitting, standing and stress. Stiffness is present All day. Associated symptoms include leg pain. Pertinent negatives include no abdominal pain, bladder incontinence, bowel incontinence, chest pain, dysuria, fever, headaches, numbness, paresis, paresthesias, pelvic pain, perianal numbness, tingling, weakness or weight loss. The treatment provided mild relief.   Jennie is an established 69-year-old patient with a history of chronic anxiety/mild depression, mixed hyperlipidemia, mitral valve prolapse, hypertension, thyroid nodules, colon polyps, osteopenia, primary osteoarthritis of both knees and chronic low back pain with sciatica who presents today for follow-up with multiple concerns  Hypertension - well controlled  Patient compliant with benazepril 10 mg daily  BP today 118/75  Denies chest pain, shortness breath  Established with a new cardiologist - DR Pizarro  Multiple thyroid nodules  Order placed to repeat thyroid ultrasound July 2025  Patient needs to schedule  Chronic low back pain with sciatica  Patient has experienced a recent exacerbation of pain that waxes and wanes  She isn't doing any heavy lifting but even when lifting small objects or bending she is experiencing pain  She has responded well to physical therapy in the  past  MRI L-spine 06/09/2021 demonstrates straightening of lumbar lordosis with a diffuse lumbar spine degenerative disc disease and facet arthrosis high-grade right lateral recess stenosis at L4-L5 possibly impinging the right L5 nerve root ; moderate bilateral foraminal stenosis stenosis at L3-L4 L4-L5 and on the left at L5-S1  Patient needs refill gabapentin  Patient has been experiencing an intermittent exacerbation of her low back pain  Chronic anxiety  Patient compliant with venlafaxine 225 mg daily  Needs refill  Mixed hyperlipidemia  Patient compliant with atorvastatin 40 mg daily  04/30/2025 LDL 88 (pretreatment 144) HDL 66 total cholesterol 168 (pretreatment 211) triglycerides 69  Concern for short term memory loss  Patient's significant other presents today (Tae) with her and reports that she has poor recollection of recent discussions even on the same day in which the discussions occurred.  She has been under a great deal of stress due to her loss of her sister 2 weeks ago, her cousin, Rashard, and recently her dear friend from childhood.    Bilateral knee pain  Bilateral degenerative arthritis confirmed with imaging   Imaging left knee 10/30/2024 confirmed marginal osteophytes with narrowing of medial and lateral compartments  Imaging of right knee 06/12/2018 some narrowing of the medial compartment with marginal osteophytes  Past Medical History:   Diagnosis Date    Chronic anxiety 05/16/2022    Dystrophic nail 12/13/2022    History of pneumonia 12/13/2022    Low back pain with sciatica 05/16/2022    Mild depression 05/16/2022    Mitral valve prolapse 05/16/2022    Mixed hyperlipidemia 05/16/2022    Multiple thyroid nodules 03/17/2025    Osteoarthritis of ankle 05/16/2022    Osteopenia of multiple sites 03/17/2025    Persistent cough 01/30/2023    Personal history of colonic polyps 06/10/2013    Primary hypertension 05/16/2022    Primary osteoarthritis of both knees 06/07/2023    Varicose veins of  "lower extremity 05/16/2022     Current Outpatient Medications   Medication Instructions    aspirin (ECOTRIN) 81 mg, Oral, Every other day    atorvastatin (LIPITOR) 40 mg, Oral, Daily    benazepriL (LOTENSIN) 10 mg, Oral, Daily    cetirizine (ZYRTEC) 10 mg, Oral, Daily    cholecalciferol (vitamin D3) (VITAMIN D3) 2,000 Units, Oral, Daily    coenzyme Q10 100 mg Chew Oral    diclofenac sodium (VOLTAREN) 2 g, Topical (Top), 3 times daily    fluticasone propionate (FLONASE) 100 mcg, Each Nostril, Daily    gabapentin (NEURONTIN) 300 mg, Oral, 3 times daily    mv-mn-folic-lutein-herbal 293 (ALIVE WOMEN'S 50 PLUS GUMMY) 120 mcg-150 mcg -37.5 mg Chew Oral    sod sulf-pot chloride-mag sulf (SUTAB) 1.479-0.188- 0.225 gram tablet Take by mouth.    venlafaxine (EFFEXOR-XR) 150 mg, Oral, Daily    venlafaxine (EFFEXOR-XR) 75 mg, Oral, Daily   Care team  Cardiology - Dr. Pizarro   Health maintenance  Breast cancer screening BI-RADS 2  benign 02/12/2025 repeat February 2026  Colon cancer screening Colonoscopy 3/6/2025 Polyp Repeat 5 years repeat March 2030  Bone density screening BMD T-score -2.0 in the left femoral neck consistent with osteopenia repeat 04/11/2027  Review of Systems   Constitutional:  Negative for fever and weight loss.   Cardiovascular:  Negative for chest pain.   Gastrointestinal:  Negative for abdominal pain and bowel incontinence.   Genitourinary:  Negative for bladder incontinence, dysuria, hematuria and pelvic pain.   Musculoskeletal:  Positive for back pain and leg pain.   Neurological:  Negative for tingling, weakness, numbness, headaches and paresthesias.            Objective:      Vitals:    06/30/25 1240   BP: 118/75   Pulse: 91   Temp: 98 °F (36.7 °C)   /75 (BP Location: Right arm, Patient Position: Sitting)   Pulse 91   Temp 98 °F (36.7 °C) (Oral)   Ht 5' 5" (1.651 m)   Wt 68.6 kg (151 lb 3.2 oz)   SpO2 97%   BMI 25.16 kg/m²       Physical Exam  Constitutional:       General: She is not in " acute distress.     Appearance: She is not ill-appearing, toxic-appearing or diaphoretic.   Cardiovascular:      Rate and Rhythm: Normal rate and regular rhythm.   Pulmonary:      Effort: Pulmonary effort is normal. No respiratory distress.      Breath sounds: No stridor. No wheezing or rales.   Musculoskeletal:      Right lower leg: No edema.      Left lower leg: No edema.      Comments: Paraspinal muscle spasm and tenderness with reduced range of motion   Neurological:      Mental Status: She is alert and oriented to person, place, and time.          Latest Reference Range & Units 04/30/25 08:30 05/21/25 08:45 06/20/25 13:27   WBC 4.50 - 11.50 x10(3)/mcL 5.05     RBC 4.20 - 5.40 x10(6)/mcL 4.75     Hemoglobin 12.0 - 16.0 g/dL 14.2     Hematocrit 37.0 - 47.0 % 44.1     MCV 80.0 - 94.0 fL 92.8     MCH 27.0 - 31.0 pg 29.9     MCHC 33.0 - 36.0 g/dL 32.2 (L)     RDW 11.5 - 17.0 % 13.5     Platelet Count 130 - 400 x10(3)/mcL 331     MPV 7.4 - 10.4 fL 9.7     Neut % % 47.6     LYMPH % % 33.3     Mono % % 13.3     Eos % % 5.0     Basophil % % 0.6     Immature Granulocytes % 0.2     Neut # 2.1 - 9.2 x10(3)/mcL 2.41     Lymph # 0.6 - 4.6 x10(3)/mcL 1.68     Mono # 0.1 - 1.3 x10(3)/mcL 0.67     Eos # 0 - 0.9 x10(3)/mcL 0.25     Baso # <=0.2 x10(3)/mcL 0.03     Immature Grans (Abs) 0.00 - 0.04 x10(3)/mcL 0.01     nRBC % 0.0     Sodium 136 - 145 mmol/L 143     Potassium 3.5 - 5.1 mmol/L 3.7     Chloride 98 - 107 mmol/L 108 (H)     CO2 23 - 31 mmol/L 26     Anion Gap mEq/L 9.0     BUN 9.8 - 20.1 mg/dL 16.5     Creatinine 0.55 - 1.02 mg/dL 0.68     BUN/CREAT RATIO  24     eGFR mL/min/1.73/m2 >60     Glucose 82 - 115 mg/dL 46 (LL)     Calcium 8.4 - 10.2 mg/dL 9.3     ALP 40 - 150 unit/L 69     PROTEIN TOTAL 5.8 - 7.6 gm/dL 7.0     Albumin 3.4 - 4.8 g/dL 3.6     Albumin/Globulin Ratio 1.1 - 2.0 ratio 1.1     BILIRUBIN TOTAL <=1.5 mg/dL 0.4     AST 11 - 45 unit/L 17     ALT 0 - 55 unit/L 11     Globulin, Total 2.4 - 3.5 gm/dL 3.4      Cholesterol Total <=200 mg/dL 168     HDL 35 - 60 mg/dL 66 (H)     Total Cholesterol/HDL Ratio 0 - 5  3     Triglycerides 37 - 140 mg/dL 69     LDL Cholesterol 50.00 - 140.00 mg/dL 88.00     Very Low Density Lipoprotein  14     Hemoglobin A1C External <=7.0 % 5.4     Estimated Avg Glucose mg/dL 108.3     VAS US Arterial Legs Bilateral   The femoral arteries, popliteal arteries and tibial vessels are patent. Velocities and waveforms are normal. There is no significant plaque.  No evidence of DVT in the femoral popliteal segments of bilateral lower extremities; reflux in the left lower extremity   BMD  Impression:     T-score of -2.0 in the left femoral neck consistent with osteopenia.        Electronically signed by:Nolan Montalvo MD  Date:                                            04/12/2025  Time:                                           10:35    Assessment/Plan:  Chronic anxiety  Mild depression  At this time due to grief reaction and multiple personal losses, we will continue Effexor  mg daily  May consider dose reduction in the near future  Encouraged that patient pursue grief counseling with hospice + either in a group or individually  -     venlafaxine (EFFEXOR-XR) 150 MG Cp24; Take 1 capsule (150 mg total) by mouth once daily.  Dispense: 30 capsule; Refill: 2  -     venlafaxine (EFFEXOR-XR) 75 MG 24 hr capsule; Take 1 capsule (75 mg total) by mouth once daily.  Dispense: 30 capsule; Refill: 2  Primary hypertension  Hypertension well-controlled  Continue benazepril as prescribed  Multiple thyroid nodules  Schedule thyroid ultrasound 07/03/2025 at 2:00 p.m.  Chronic bilateral low back pain with right-sided sciatica  Recommend referral to Physical therapy for evaluation treatment  Refill gabapentin 300 mg 3 times a day  Symptoms do not improve with short course of physical therapy, may need repeat imaging with MRI lumbar spine  -     Ambulatory Referral/Consult to Physical Therapy; Future; Expected date:  07/07/2025  Primary osteoarthritis of both knees  May wear neoprene knee brace to support knees  Consider imaging particularly of right knee  Consider corticosteroid injection  -     Ambulatory Referral/Consult to Physical Therapy; Future; Expected date: 07/07/2025  Concern for cognitive impairment/short term memory impairment  Xi to complete memory testing today  PHQ-9/JIMENA-7 collected today  CMP and CBC normal except for a low blood sugar noted on last 2 assessments.  Recent hemoglobin A1c 5.4% 04/30/2025 last TSH 2/26/2025 2.129 No recent B12 or folate  Memory loss could be related to multiple factors including 1.  Grief reaction 2.  Current medication including SNRI and or gabapentin  We will review data collected and consider repeating B12/folate at next visit or before  Osteopenia  Encouraged weight-bearing exercise  Continued Vitamin-D supplementation  Calcium rich diet  Repeat bone mineral density in 2 years      Follow up in about 6 weeks (around 8/11/2025).

## 2025-07-03 ENCOUNTER — HOSPITAL ENCOUNTER (OUTPATIENT)
Dept: RADIOLOGY | Facility: HOSPITAL | Age: 70
Discharge: HOME | End: 2025-07-03
Attending: FAMILY MEDICINE
Payer: MEDICARE

## 2025-07-03 DIAGNOSIS — E04.2 MULTIPLE THYROID NODULES: ICD-10-CM

## 2025-07-03 PROCEDURE — 76536 US EXAM OF HEAD AND NECK: CPT | Mod: TC

## 2025-08-04 ENCOUNTER — TELEPHONE (OUTPATIENT)
Dept: FAMILY MEDICINE | Facility: CLINIC | Age: 70
End: 2025-08-04
Payer: MEDICARE

## 2025-08-04 DIAGNOSIS — E04.2 MULTIPLE THYROID NODULES: Primary | ICD-10-CM

## 2025-08-04 NOTE — TELEPHONE ENCOUNTER
Patient needs referral to outside ENT for possible FNA or thyroid nodule.  Her current ENT does not take her insurance any longer.  She requests a referral to Dr. Bam Phelan.  Referral placed

## 2025-08-11 ENCOUNTER — OFFICE VISIT (OUTPATIENT)
Dept: FAMILY MEDICINE | Facility: CLINIC | Age: 70
End: 2025-08-11
Payer: MEDICARE

## 2025-08-11 VITALS
WEIGHT: 154 LBS | HEIGHT: 65 IN | BODY MASS INDEX: 25.66 KG/M2 | OXYGEN SATURATION: 98 % | HEART RATE: 96 BPM | SYSTOLIC BLOOD PRESSURE: 114 MMHG | DIASTOLIC BLOOD PRESSURE: 73 MMHG | TEMPERATURE: 98 F

## 2025-08-11 DIAGNOSIS — R23.2 HOT FLASHES: ICD-10-CM

## 2025-08-11 DIAGNOSIS — F41.9 CHRONIC ANXIETY: ICD-10-CM

## 2025-08-11 DIAGNOSIS — F32.A MILD DEPRESSION: ICD-10-CM

## 2025-08-11 DIAGNOSIS — E04.2 MULTIPLE THYROID NODULES: Primary | ICD-10-CM

## 2025-08-11 DIAGNOSIS — M85.89 OSTEOPENIA OF MULTIPLE SITES: ICD-10-CM

## 2025-08-11 PROCEDURE — 99214 OFFICE O/P EST MOD 30 MIN: CPT | Mod: PBBFAC | Performed by: FAMILY MEDICINE

## 2025-08-11 RX ORDER — GABAPENTIN 300 MG/1
300 CAPSULE ORAL 2 TIMES DAILY
Start: 2025-08-11